# Patient Record
Sex: FEMALE | URBAN - METROPOLITAN AREA
[De-identification: names, ages, dates, MRNs, and addresses within clinical notes are randomized per-mention and may not be internally consistent; named-entity substitution may affect disease eponyms.]

---

## 2019-08-09 ENCOUNTER — HOSPITAL ENCOUNTER (OUTPATIENT)
Age: 79
Setting detail: SPECIMEN
Discharge: HOME | End: 2019-08-09
Payer: MEDICARE

## 2019-08-14 LAB — SURGICAL PATHOLOGY REPORT: NORMAL

## 2020-03-04 ENCOUNTER — HOSPITAL ENCOUNTER (OUTPATIENT)
Age: 80
Setting detail: SPECIMEN
Discharge: HOME | End: 2020-03-04
Payer: MEDICARE

## 2020-03-09 LAB — SURGICAL PATHOLOGY REPORT: NORMAL

## 2023-05-10 ENCOUNTER — HOSPITAL ENCOUNTER (OUTPATIENT)
Age: 83
Setting detail: SPECIMEN
Discharge: HOME | End: 2023-05-10

## 2023-05-10 ENCOUNTER — OFFICE VISIT (OUTPATIENT)
Dept: FAMILY MEDICINE CLINIC | Age: 83
End: 2023-05-10
Payer: MEDICARE

## 2023-05-10 VITALS
TEMPERATURE: 97.6 F | SYSTOLIC BLOOD PRESSURE: 112 MMHG | HEART RATE: 82 BPM | HEIGHT: 64 IN | DIASTOLIC BLOOD PRESSURE: 76 MMHG | OXYGEN SATURATION: 96 % | BODY MASS INDEX: 33.29 KG/M2 | WEIGHT: 195 LBS

## 2023-05-10 DIAGNOSIS — R53.83 LETHARGY: ICD-10-CM

## 2023-05-10 DIAGNOSIS — Z85.850 HISTORY OF THYROID CANCER: ICD-10-CM

## 2023-05-10 DIAGNOSIS — Z13.220 LIPID SCREENING: ICD-10-CM

## 2023-05-10 DIAGNOSIS — R73.09 ABNORMAL GLUCOSE: ICD-10-CM

## 2023-05-10 DIAGNOSIS — E89.0 H/O THYROIDECTOMY: ICD-10-CM

## 2023-05-10 DIAGNOSIS — Z00.00 ENCOUNTER FOR MEDICAL EXAMINATION TO ESTABLISH CARE: Primary | ICD-10-CM

## 2023-05-10 DIAGNOSIS — Z13.1 DIABETES MELLITUS SCREENING: ICD-10-CM

## 2023-05-10 DIAGNOSIS — E55.9 VITAMIN D DEFICIENCY: ICD-10-CM

## 2023-05-10 DIAGNOSIS — R91.1 LUNG NODULE: ICD-10-CM

## 2023-05-10 LAB
25(OH)D3 SERPL-MCNC: 69.9 NG/ML
ABSOLUTE EOS #: 0.08 K/UL (ref 0–0.44)
ABSOLUTE IMMATURE GRANULOCYTE: 0.03 K/UL (ref 0–0.3)
ABSOLUTE LYMPH #: 1.42 K/UL (ref 1.1–3.7)
ABSOLUTE MONO #: 0.52 K/UL (ref 0.1–1.2)
ALBUMIN SERPL-MCNC: 4.1 G/DL (ref 3.5–5.2)
ALBUMIN/GLOBULIN RATIO: 1.3 (ref 1–2.5)
ALP SERPL-CCNC: 99 U/L (ref 35–104)
ALT SERPL-CCNC: 14 U/L (ref 5–33)
ANION GAP SERPL CALCULATED.3IONS-SCNC: 17 MMOL/L (ref 9–17)
AST SERPL-CCNC: 16 U/L
BASOPHILS # BLD: 0 % (ref 0–2)
BASOPHILS ABSOLUTE: 0.03 K/UL (ref 0–0.2)
BILIRUB SERPL-MCNC: 0.3 MG/DL (ref 0.3–1.2)
BUN SERPL-MCNC: 24 MG/DL (ref 8–23)
CALCIUM SERPL-MCNC: 10.4 MG/DL (ref 8.6–10.4)
CHLORIDE SERPL-SCNC: 102 MMOL/L (ref 98–107)
CHOLEST SERPL-MCNC: 187 MG/DL
CHOLESTEROL/HDL RATIO: 3.1
CO2 SERPL-SCNC: 23 MMOL/L (ref 20–31)
CREAT SERPL-MCNC: 0.78 MG/DL (ref 0.5–0.9)
EOSINOPHILS RELATIVE PERCENT: 1 % (ref 1–4)
GFR SERPL CREATININE-BSD FRML MDRD: >60 ML/MIN/1.73M2
GLUCOSE SERPL-MCNC: 106 MG/DL (ref 70–99)
HCT VFR BLD AUTO: 43.3 % (ref 36.3–47.1)
HDLC SERPL-MCNC: 60 MG/DL
HGB BLD-MCNC: 13.5 G/DL (ref 11.9–15.1)
IMMATURE GRANULOCYTES: 0 %
LDLC SERPL CALC-MCNC: 103 MG/DL (ref 0–130)
LYMPHOCYTES # BLD: 20 % (ref 24–43)
MCH RBC QN AUTO: 27.7 PG (ref 25.2–33.5)
MCHC RBC AUTO-ENTMCNC: 31.2 G/DL (ref 28.4–34.8)
MCV RBC AUTO: 88.9 FL (ref 82.6–102.9)
MONOCYTES # BLD: 7 % (ref 3–12)
NRBC AUTOMATED: 0 PER 100 WBC
PDW BLD-RTO: 14.5 % (ref 11.8–14.4)
PLATELET # BLD AUTO: 314 K/UL (ref 138–453)
PMV BLD AUTO: 9.7 FL (ref 8.1–13.5)
POTASSIUM SERPL-SCNC: 4.4 MMOL/L (ref 3.7–5.3)
PROT SERPL-MCNC: 7.3 G/DL (ref 6.4–8.3)
RBC # BLD: 4.87 M/UL (ref 3.95–5.11)
RBC # BLD: ABNORMAL 10*6/UL
SEG NEUTROPHILS: 72 % (ref 36–65)
SEGMENTED NEUTROPHILS ABSOLUTE COUNT: 5.05 K/UL (ref 1.5–8.1)
SODIUM SERPL-SCNC: 142 MMOL/L (ref 135–144)
T4 FREE SERPL-MCNC: 1.8 NG/DL (ref 0.9–1.7)
TRIGL SERPL-MCNC: 121 MG/DL
TSH SERPL-ACNC: 0.18 UIU/ML (ref 0.3–5)
WBC # BLD AUTO: 7.1 K/UL (ref 3.5–11.3)

## 2023-05-10 PROCEDURE — 1123F ACP DISCUSS/DSCN MKR DOCD: CPT | Performed by: NURSE PRACTITIONER

## 2023-05-10 PROCEDURE — G8400 PT W/DXA NO RESULTS DOC: HCPCS | Performed by: NURSE PRACTITIONER

## 2023-05-10 PROCEDURE — 1090F PRES/ABSN URINE INCON ASSESS: CPT | Performed by: NURSE PRACTITIONER

## 2023-05-10 PROCEDURE — 99203 OFFICE O/P NEW LOW 30 MIN: CPT | Performed by: NURSE PRACTITIONER

## 2023-05-10 PROCEDURE — 1036F TOBACCO NON-USER: CPT | Performed by: NURSE PRACTITIONER

## 2023-05-10 PROCEDURE — G8427 DOCREV CUR MEDS BY ELIG CLIN: HCPCS | Performed by: NURSE PRACTITIONER

## 2023-05-10 PROCEDURE — G8417 CALC BMI ABV UP PARAM F/U: HCPCS | Performed by: NURSE PRACTITIONER

## 2023-05-10 RX ORDER — CELECOXIB 200 MG/1
200 CAPSULE ORAL
COMMUNITY
Start: 2021-06-11

## 2023-05-10 RX ORDER — LEVOTHYROXINE SODIUM 112 UG/1
1 TABLET ORAL DAILY
COMMUNITY
End: 2023-05-12 | Stop reason: SDUPTHER

## 2023-05-10 RX ORDER — POTASSIUM CHLORIDE 750 MG/1
TABLET, FILM COATED, EXTENDED RELEASE ORAL
COMMUNITY
Start: 2023-03-31

## 2023-05-10 RX ORDER — LISINOPRIL AND HYDROCHLOROTHIAZIDE 20; 12.5 MG/1; MG/1
1 TABLET ORAL DAILY
COMMUNITY
Start: 2022-09-21

## 2023-05-10 RX ORDER — LEVOTHYROXINE SODIUM 0.12 MG/1
1 TABLET ORAL DAILY
COMMUNITY
End: 2023-05-12 | Stop reason: ALTCHOICE

## 2023-05-10 RX ORDER — VERAPAMIL HYDROCHLORIDE 240 MG/1
240 CAPSULE, EXTENDED RELEASE ORAL
COMMUNITY
Start: 2022-09-21

## 2023-05-10 RX ORDER — THIAMINE HCL 100 MG
600 TABLET ORAL 3 TIMES DAILY
COMMUNITY

## 2023-05-10 SDOH — ECONOMIC STABILITY: FOOD INSECURITY: WITHIN THE PAST 12 MONTHS, THE FOOD YOU BOUGHT JUST DIDN'T LAST AND YOU DIDN'T HAVE MONEY TO GET MORE.: NEVER TRUE

## 2023-05-10 SDOH — ECONOMIC STABILITY: INCOME INSECURITY: HOW HARD IS IT FOR YOU TO PAY FOR THE VERY BASICS LIKE FOOD, HOUSING, MEDICAL CARE, AND HEATING?: NOT HARD AT ALL

## 2023-05-10 SDOH — ECONOMIC STABILITY: HOUSING INSECURITY
IN THE LAST 12 MONTHS, WAS THERE A TIME WHEN YOU DID NOT HAVE A STEADY PLACE TO SLEEP OR SLEPT IN A SHELTER (INCLUDING NOW)?: NO

## 2023-05-10 SDOH — ECONOMIC STABILITY: FOOD INSECURITY: WITHIN THE PAST 12 MONTHS, YOU WORRIED THAT YOUR FOOD WOULD RUN OUT BEFORE YOU GOT MONEY TO BUY MORE.: NEVER TRUE

## 2023-05-10 ASSESSMENT — ANXIETY QUESTIONNAIRES
7. FEELING AFRAID AS IF SOMETHING AWFUL MIGHT HAPPEN: 0
IF YOU CHECKED OFF ANY PROBLEMS ON THIS QUESTIONNAIRE, HOW DIFFICULT HAVE THESE PROBLEMS MADE IT FOR YOU TO DO YOUR WORK, TAKE CARE OF THINGS AT HOME, OR GET ALONG WITH OTHER PEOPLE: NOT DIFFICULT AT ALL
6. BECOMING EASILY ANNOYED OR IRRITABLE: 0
GAD7 TOTAL SCORE: 0
5. BEING SO RESTLESS THAT IT IS HARD TO SIT STILL: 0
2. NOT BEING ABLE TO STOP OR CONTROL WORRYING: 0
1. FEELING NERVOUS, ANXIOUS, OR ON EDGE: 0
4. TROUBLE RELAXING: 0
3. WORRYING TOO MUCH ABOUT DIFFERENT THINGS: 0

## 2023-05-10 ASSESSMENT — PATIENT HEALTH QUESTIONNAIRE - PHQ9
1. LITTLE INTEREST OR PLEASURE IN DOING THINGS: 0
SUM OF ALL RESPONSES TO PHQ QUESTIONS 1-9: 0
SUM OF ALL RESPONSES TO PHQ9 QUESTIONS 1 & 2: 0
SUM OF ALL RESPONSES TO PHQ QUESTIONS 1-9: 0
SUM OF ALL RESPONSES TO PHQ QUESTIONS 1-9: 0
2. FEELING DOWN, DEPRESSED OR HOPELESS: 0
SUM OF ALL RESPONSES TO PHQ QUESTIONS 1-9: 0

## 2023-05-10 ASSESSMENT — ENCOUNTER SYMPTOMS
COUGH: 0
CONSTIPATION: 0
VOMITING: 0
DIARRHEA: 0
SHORTNESS OF BREATH: 0
NAUSEA: 0

## 2023-05-11 LAB
EST. AVERAGE GLUCOSE BLD GHB EST-MCNC: 94 MG/DL
HBA1C MFR BLD: 4.9 % (ref 4–6)

## 2023-05-12 DIAGNOSIS — E89.0 H/O THYROIDECTOMY: Primary | ICD-10-CM

## 2023-05-12 RX ORDER — LEVOTHYROXINE SODIUM 112 UG/1
112 TABLET ORAL DAILY
Qty: 30 TABLET | Refills: 1 | Status: SHIPPED | OUTPATIENT
Start: 2023-05-12 | End: 2024-05-11

## 2023-06-02 ENCOUNTER — HOSPITAL ENCOUNTER (OUTPATIENT)
Dept: CT IMAGING | Age: 83
End: 2023-06-02
Payer: MEDICARE

## 2023-06-02 DIAGNOSIS — R91.1 LUNG NODULE: ICD-10-CM

## 2023-06-02 PROCEDURE — 71250 CT THORAX DX C-: CPT

## 2023-09-13 RX ORDER — CELECOXIB 200 MG/1
200 CAPSULE ORAL DAILY
Qty: 90 CAPSULE | Refills: 0 | Status: SHIPPED | OUTPATIENT
Start: 2023-09-13

## 2023-09-13 NOTE — TELEPHONE ENCOUNTER
Lydia Zavala is calling to request a refill on the following medication(s):    Medication Request:  Requested Prescriptions     Pending Prescriptions Disp Refills    celecoxib (CELEBREX) 200 MG capsule [Pharmacy Med Name: CELECOXIB 200 MG CAPSULE] 90 capsule      Sig: TAKE ONE CAPSULE BY MOUTH DAILY       Last Visit Date (If Applicable):  Visit date not found    Next Visit Date:    Visit date not found

## 2023-11-14 ENCOUNTER — OFFICE VISIT (OUTPATIENT)
Dept: FAMILY MEDICINE CLINIC | Age: 83
End: 2023-11-14
Payer: MEDICARE

## 2023-11-14 ENCOUNTER — HOSPITAL ENCOUNTER (OUTPATIENT)
Age: 83
Setting detail: SPECIMEN
Discharge: HOME | End: 2023-11-14

## 2023-11-14 VITALS
DIASTOLIC BLOOD PRESSURE: 94 MMHG | TEMPERATURE: 97.7 F | SYSTOLIC BLOOD PRESSURE: 156 MMHG | HEART RATE: 80 BPM | BODY MASS INDEX: 34.66 KG/M2 | OXYGEN SATURATION: 97 % | HEIGHT: 64 IN | RESPIRATION RATE: 18 BRPM | WEIGHT: 203 LBS

## 2023-11-14 DIAGNOSIS — Z00.00 INITIAL MEDICARE ANNUAL WELLNESS VISIT: Primary | ICD-10-CM

## 2023-11-14 DIAGNOSIS — M81.0 OSTEOPOROSIS WITHOUT CURRENT PATHOLOGICAL FRACTURE, UNSPECIFIED OSTEOPOROSIS TYPE: ICD-10-CM

## 2023-11-14 DIAGNOSIS — C73 MALIGNANT NEOPLASM OF THYROID GLAND (HCC): ICD-10-CM

## 2023-11-14 DIAGNOSIS — E89.0 H/O THYROIDECTOMY: ICD-10-CM

## 2023-11-14 DIAGNOSIS — Z00.00 ENCOUNTER FOR SUBSEQUENT ANNUAL WELLNESS VISIT (AWV) IN MEDICARE PATIENT: Primary | ICD-10-CM

## 2023-11-14 DIAGNOSIS — Z76.89 ENCOUNTER TO ESTABLISH CARE WITH NEW DOCTOR: ICD-10-CM

## 2023-11-14 DIAGNOSIS — Z12.31 SCREENING MAMMOGRAM FOR BREAST CANCER: ICD-10-CM

## 2023-11-14 DIAGNOSIS — R91.1 LUNG NODULE: ICD-10-CM

## 2023-11-14 DIAGNOSIS — Z85.850 HISTORY OF THYROID CANCER: ICD-10-CM

## 2023-11-14 DIAGNOSIS — I10 PRIMARY HYPERTENSION: ICD-10-CM

## 2023-11-14 PROBLEM — Z90.89 H/O THYROIDECTOMY: Status: ACTIVE | Noted: 2023-11-14

## 2023-11-14 PROBLEM — Z98.890 H/O THYROIDECTOMY: Status: ACTIVE | Noted: 2023-11-14

## 2023-11-14 PROBLEM — I44.7 LEFT BUNDLE-BRANCH BLOCK: Status: ACTIVE | Noted: 2023-11-14

## 2023-11-14 LAB
25(OH)D3 SERPL-MCNC: 54.7 NG/ML (ref 30–100)
CA-I BLD-SCNC: 1.31 MMOL/L (ref 1.13–1.33)
PHOSPHATE SERPL-MCNC: 2.9 MG/DL (ref 2.5–4.5)
PTH-INTACT SERPL-MCNC: 35 PG/ML (ref 15–65)
T4 FREE SERPL-MCNC: 1.9 NG/DL (ref 0.93–1.7)
TSH SERPL DL<=0.05 MIU/L-ACNC: 0.94 UIU/ML (ref 0.27–4.2)

## 2023-11-14 PROCEDURE — 99214 OFFICE O/P EST MOD 30 MIN: CPT | Performed by: NURSE PRACTITIONER

## 2023-11-14 PROCEDURE — 3074F SYST BP LT 130 MM HG: CPT | Performed by: NURSE PRACTITIONER

## 2023-11-14 PROCEDURE — G8427 DOCREV CUR MEDS BY ELIG CLIN: HCPCS | Performed by: NURSE PRACTITIONER

## 2023-11-14 PROCEDURE — G8400 PT W/DXA NO RESULTS DOC: HCPCS | Performed by: NURSE PRACTITIONER

## 2023-11-14 PROCEDURE — G8484 FLU IMMUNIZE NO ADMIN: HCPCS | Performed by: NURSE PRACTITIONER

## 2023-11-14 PROCEDURE — G0439 PPPS, SUBSEQ VISIT: HCPCS | Performed by: NURSE PRACTITIONER

## 2023-11-14 PROCEDURE — 1036F TOBACCO NON-USER: CPT | Performed by: NURSE PRACTITIONER

## 2023-11-14 PROCEDURE — 1090F PRES/ABSN URINE INCON ASSESS: CPT | Performed by: NURSE PRACTITIONER

## 2023-11-14 PROCEDURE — 1123F ACP DISCUSS/DSCN MKR DOCD: CPT | Performed by: NURSE PRACTITIONER

## 2023-11-14 PROCEDURE — 3078F DIAST BP <80 MM HG: CPT | Performed by: NURSE PRACTITIONER

## 2023-11-14 PROCEDURE — G8417 CALC BMI ABV UP PARAM F/U: HCPCS | Performed by: NURSE PRACTITIONER

## 2023-11-14 RX ORDER — CELECOXIB 200 MG/1
200 CAPSULE ORAL DAILY
Qty: 90 CAPSULE | Refills: 3 | Status: SHIPPED | OUTPATIENT
Start: 2023-11-14 | End: 2024-11-13

## 2023-11-14 RX ORDER — LEVOTHYROXINE SODIUM 112 UG/1
112 TABLET ORAL DAILY
Qty: 90 TABLET | Refills: 3 | Status: SHIPPED | OUTPATIENT
Start: 2023-11-14 | End: 2024-11-13

## 2023-11-14 RX ORDER — LISINOPRIL AND HYDROCHLOROTHIAZIDE 20; 12.5 MG/1; MG/1
1 TABLET ORAL DAILY
Qty: 90 TABLET | Refills: 3 | Status: SHIPPED | OUTPATIENT
Start: 2023-11-14 | End: 2024-11-13

## 2023-11-14 RX ORDER — VIT C/B6/B5/MAGNESIUM/HERB 173 50-5-6-5MG
500 CAPSULE ORAL DAILY
COMMUNITY

## 2023-11-14 RX ORDER — VERAPAMIL HYDROCHLORIDE 240 MG/1
240 CAPSULE, EXTENDED RELEASE ORAL NIGHTLY
Qty: 90 CAPSULE | Refills: 3 | Status: SHIPPED | OUTPATIENT
Start: 2023-11-14 | End: 2024-11-13

## 2023-11-14 ASSESSMENT — PATIENT HEALTH QUESTIONNAIRE - PHQ9
SUM OF ALL RESPONSES TO PHQ QUESTIONS 1-9: 0
2. FEELING DOWN, DEPRESSED OR HOPELESS: 0
SUM OF ALL RESPONSES TO PHQ QUESTIONS 1-9: 0
SUM OF ALL RESPONSES TO PHQ QUESTIONS 1-9: 0
1. LITTLE INTEREST OR PLEASURE IN DOING THINGS: 0
SUM OF ALL RESPONSES TO PHQ QUESTIONS 1-9: 0
SUM OF ALL RESPONSES TO PHQ9 QUESTIONS 1 & 2: 0

## 2023-11-14 ASSESSMENT — LIFESTYLE VARIABLES
HOW MANY STANDARD DRINKS CONTAINING ALCOHOL DO YOU HAVE ON A TYPICAL DAY: PATIENT DOES NOT DRINK
HOW OFTEN DO YOU HAVE A DRINK CONTAINING ALCOHOL: NEVER

## 2023-11-14 ASSESSMENT — ENCOUNTER SYMPTOMS
CONSTIPATION: 0
SHORTNESS OF BREATH: 0
SORE THROAT: 0
RHINORRHEA: 0
COUGH: 0
ABDOMINAL PAIN: 0
CHEST TIGHTNESS: 0
BACK PAIN: 0
SINUS PRESSURE: 0
DIARRHEA: 0
NAUSEA: 0
BLOOD IN STOOL: 0
TROUBLE SWALLOWING: 0
ABDOMINAL DISTENTION: 0
WHEEZING: 0

## 2023-11-14 ASSESSMENT — VISUAL ACUITY: OU: 1

## 2023-11-14 NOTE — PROGRESS NOTES
[address]   [address]  [phone]    11/14/23     Patient ID  Kaye Dash is a 81 y.o. female  Established patient    Chief Complaint  Kaye Dash presents today for lung nodule, Hypertension, and Thyroid Problem    Have you seen any other physician or provider since your last visit? Ortho- Dr. Michael Sawyer, Endocrinology- Dr. Cyrus Pulliam, Dr. Cristobal Ward- Ophthalmology   Have you had any other diagnostic tests since your last visit? Yes - Records Obtained CT chest, Blood work   Have you been seen in the emergency room and/or had an admission to a hospital since we last saw you? No     ASSESSMENT/PLAN  1. Encounter for subsequent annual wellness visit (AWV) in Medicare patient  2. Primary hypertension  -     PTH, Intact with Ionized Calcium; Future  -     Phosphorus; Future  -     TSH; Future  -     T4, Free; Future  3. Lung nodule  -     Low Dose Chest CT -Abnormal Lung Screen Follow up; Future  4. H/O thyroidectomy  -     PTH, Intact with Ionized Calcium; Future  -     Phosphorus; Future  -     TSH; Future  -     T4, Free; Future  5. History of thyroid cancer  -     PTH, Intact with Ionized Calcium; Future  -     Phosphorus; Future  -     TSH; Future  -     T4, Free; Future  6. Osteoporosis without current pathological fracture, unspecified osteoporosis type  -     PTH, Intact with Ionized Calcium; Future  -     Phosphorus; Future  -     Vitamin D 25 Hydroxy; Future  -     DEXA BONE DENSITY AXIAL SKELETON; Future  7. Encounter to establish care with new doctor     Check labs   Stop potassium   May need changes in BP meds - take lisinopril in am and verapamil nightly      ordered    Request imaging Mario Lee notes and scopes with GI       Return in about 3 months (around 2/14/2024) for HTN.       Patient Care Team:  DONI Dodge - CNP as PCP - General (Nurse Practitioner Family)    SUBJECTIVE/OBJECTIVE  History of Present illness / Visit

## 2023-11-14 NOTE — PATIENT INSTRUCTIONS
Learning About Vision Tests  What are vision tests? The four most common vision tests are visual acuity tests, refraction, visual field tests, and color vision tests. Visual acuity (sharpness) tests  These tests are used: To see if you need glasses or contact lenses. To monitor an eye problem. To check an eye injury. Visual acuity tests are done as part of routine exams. You may also have this test when you get your 's license or apply for some types of jobs. Visual field tests  These tests are used: To check for vision loss in any area of your range of vision. To screen for certain eye diseases. To look for nerve damage after a stroke, head injury, or other problem that could reduce blood flow to the brain. Refraction and color tests  A refraction test is done to find the right prescription for glasses and contact lenses. A color vision test is done to check for color blindness. Color vision is often tested as part of a routine exam. You may also have this test when you apply for a job where recognizing different colors is important, such as , electronics, or the [employer]. How are vision tests done? Visual acuity test   You cover one eye at a time. You read aloud from a wall chart across the room. You read aloud from a small card that you hold in your hand. Refraction   You look into a special device. The device puts lenses of different strengths in front of each eye to see how strong your glasses or contact lenses need to be. Visual field tests   Your doctor may have you look through special machines. Or your doctor may simply have you stare straight ahead while they move a finger into and out of your field of vision. Color vision test   You look at pieces of printed test patterns in various colors. You say what number or symbol you see. Your doctor may have you trace the number or symbol using a pointer. How do these tests feel?   There is very little chance of Patient Education       Well Child Exam 15 to 18 Years   About this topic   Your teen's well child exam is a visit with the doctor to check your child's health. The doctor measures your teen's weight and height, and may measure your teen's body mass index (BMI). The doctor plots these numbers on a growth curve. The growth curve gives a picture of your teen's growth at each visit. The doctor may listen to your teen's heart, lungs, and belly. Your doctor will do a full exam of your teen from the head to the toes.  Your teen may also need shots or blood tests during this visit.  General   Growth and Development   Your doctor will ask you how your teen is developing. The doctor will focus on the skills that most teens your child's age are expected to do. During this time of your teen's life, here are some things you can expect.  Physical development - Your teen may:  Look physically older than actual age  Need reminders about drinking water when active  Not want to do physical activity if your teen does not feel good at sports  Hearing, seeing, and talking - Your teen may:  Be able to see the long-term effects of actions  Have more ability to think and reason logically  Understand many viewpoints  Spend more time using interactive media, rather than face-to-face communication  Feelings and behavior - Your teen may:  Be very independent  Spend a great deal of time with friends  Have an interest in dating  Value the opinions of friends over parents' thoughts or ideas  Want to push the limits of what is allowed  Believe bad things wont happen to them  Feel very sad or have a low mood at times  Feeding - Your teen needs:  To learn to make healthy choices when eating. Serve healthy foods like lean meats, fruits, vegetables, and whole grains. Help your teen choose healthy foods when out to eat.  To start each day with a healthy breakfast  To limit soda, chips, candy, and foods that are high in fats  Healthy snacks available  like fruit, cheese and crackers, or peanut butter  To eat meals as a part of the family. Turn the TV and cell phones off while eating. Talk about your day, rather than focusing on what your teen is eating.  Sleep - Your teen:  Needs 8 to 9 hours of sleep each night  Should be allowed to read each night before bed. Have your teen brush and floss the teeth before going to bed as well.  Should limit TV, phone, and computers for an hour before bedtime  Keep cell phones, tablets, televisions, and other electronic devices out of bedrooms overnight. They interfere with sleep.  Needs a routine to make week nights easier. Encourage your teen to get up at a normal time on weekends instead of sleeping late.  Shots or vaccines - It is important for your teen to get shots on time. This protects your teen from very serious illnesses like pneumonia, blood and brain infections, tetanus, flu, or cancer. Your teen may need:  HPV or human papillomavirus vaccine  Influenza vaccine  Meningococcal vaccine  Help for Parents   Activities.  Encourage your teen to spend at least 30 to 60 minutes each day being physically active.  Offer your teen a variety of activities to take part in. Include music, sports, arts and crafts, and other things your teen is interested in. Take care not to over schedule your teen. One to 2 activities a week outside of school is often a good number for your teen.  Make sure your teen wears a helmet when using anything with wheels like skates, skateboard, bike, etc.  Encourage time spent with friends. Provide a safe area for this.  Know where and who your teen is with at all times. Get to know your teen's friends and families.  Here are some things you can do to help keep your teen safe and healthy.  Teach your teen about safe driving. Remind your teen never to ride with someone who has been drinking or using drugs. Talk about distracted driving. Teach your teen never to text or use a cell phone while  driving.  Make sure your teen uses a seat belt when driving or riding in a car. Talk with your teen about how many passengers are allowed in the car.  Talk to your teen about the dangers of smoking, drinking alcohol, and using drugs. Do not allow anyone to smoke in your home or around your teen.  Talk with your teen about peer pressure. Help your teen learn how to handle risky things friends may want to do.  Talk about sexually responsible behavior and delaying sexual intercourse. Discuss birth control and sexually-transmitted diseases. Talk about how alcohol or drugs can influence the ability to make good decisions.  Remind your teen to use headphones responsibly. Limit how loud the volume is turned up. Never wear headphones, text, or use a cell phone while riding a bike or crossing the street.  Protect your teen from gun injuries. If you have a gun, use a trigger lock. Keep the gun locked up and the bullets kept in a separate place.  Limit screen time for teens to 1 to 2 hours per day. This includes TV, phones, computers, and video games.  Parents need to think about:  Monitoring your teen's computer and phone use, especially when on the Internet  How to keep open lines of communication about sex and dating  College and work plans for your teen  Finding an adult doctor to care for your teen  Turning responsibilities of health care over to your teen  Having your teen help with some family chores to encourage responsibility within the family  The next well teen visit will most likely be in 1 year. At this visit, your doctor may:  Do a full check up on your teen  Talk about college and work  Talk about sexuality and sexually-transmitted diseases  Talk about driving and safety  When do I need to call the doctor?   Fever of 100.4°F (38°C) or higher  Low mood, suddenly getting poor grades, or missing school  You are worried about alcohol or drug use  You are worried about your teen's development  Where can I learn more?    Centers for Disease Control and Prevention  https://www.cdc.gov/ncbddd/childdevelopment/positiveparenting/adolescence2.html   Centers for Disease Control and Prevention  https://www.cdc.gov/vaccines/parents/diseases/teen/index.html   KidsHealth  http://kidshealth.org/parent/growth/medical/checkup-15yrs.html#roc760   KidsHealth  http://kidshealth.org/parent/growth/medical/checkup_16yrs.html#biz297   KidsHealth  http://kidshealth.org/parent/growth/medical/checkup_17yrs.html#eit521   KidsHealth  http://kidshealth.org/parent/growth/medical/checkup_18yrs.html#   Last Reviewed Date   2019-10-14  Consumer Information Use and Disclaimer   This information is not specific medical advice and does not replace information you receive from your health care provider. This is only a brief summary of general information. It does NOT include all information about conditions, illnesses, injuries, tests, procedures, treatments, therapies, discharge instructions or life-style choices that may apply to you. You must talk with your health care provider for complete information about your health and treatment options. This information should not be used to decide whether or not to accept your health care providers advice, instructions or recommendations. Only your health care provider has the knowledge and training to provide advice that is right for you.  Copyright   Copyright © 2021 UpToDate, Inc. and its affiliates and/or licensors. All rights reserved.    If you have an active [facility] account, please look for your well child questionnaire to come to your [facility] account before your next well child visit.  Children younger than 13 must be in the rear seat of a vehicle when available and properly restrained.

## 2023-11-14 NOTE — PROGRESS NOTES
Medicare Annual Wellness Visit    Joaquin Dutta is here for Medicare AWV    Assessment & Plan   Initial Medicare annual wellness visit  Malignant neoplasm of thyroid gland ([address])  Lung nodule  H/O thyroidectomy  Recommendations for Preventive Services Due: see orders and patient instructions/AVS.  Recommended screening schedule for the next 5-10 years is provided to the patient in written form: see Patient Instructions/AVS.     No follow-ups on file. Subjective   The following acute and/or chronic problems were also addressed today:  See additional OV note. Patient's complete Health Risk Assessment and screening values have been reviewed and are found in Flowsheets. The following problems were reviewed today and where indicated follow up appointments were made and/or referrals ordered. Positive Risk Factor Screenings with Interventions:                 Weight and Activity:  Physical Activity: Insufficiently Active (11/14/2023)    Exercise Vital Sign     Days of Exercise per Week: 2 days     Minutes of Exercise per Session: 30 min     On average, how many days per week do you engage in moderate to strenuous exercise (like a brisk walk)?: 2 days ([address] [facility])  Have you lost any weight without trying in the past 3 months?: No  There is no height or weight on file to calculate BMI. (!) Abnormal  Obesity Interventions:  Patient declines any further evaluation or treatment            Vision Screen:  Do you have difficulty driving, watching TV, or doing any of your daily activities because of your eyesight?: (!) Yes (Needing cataract surgery- bilateral)  Have you had an eye exam within the past year?: Yes  No results found.     Interventions:   Patient declines any further evaluation or treatment  Follows routinely with eye MD     Safety:  Do you have working smoke detectors?: (!) No  Interventions:  Patient declined any further interventions or treatment  Has many assistive devices in

## 2023-11-14 NOTE — PATIENT INSTRUCTIONS
Hold potassium   Take Prinzide daily and verapamil nightly     Call and scheduled with endocrinology     Schedule mammogram, dexa scan and CT lung screen

## 2023-11-16 DIAGNOSIS — R91.1 LUNG NODULE: Primary | ICD-10-CM

## 2023-11-18 NOTE — RESULT ENCOUNTER NOTE
Please call. Patient does not use MyChart. Labs reviewed. Thyroid stable. No changes in pharmaology.

## 2023-12-12 ENCOUNTER — HOSPITAL ENCOUNTER (OUTPATIENT)
Dept: CT IMAGING | Age: 83
Discharge: HOME | End: 2023-12-14
Payer: MEDICARE

## 2023-12-12 DIAGNOSIS — R91.1 LUNG NODULE: ICD-10-CM

## 2023-12-12 PROCEDURE — 71250 CT THORAX DX C-: CPT

## 2023-12-26 DIAGNOSIS — R91.1 LUNG NODULE: Primary | ICD-10-CM

## 2024-01-23 RX ORDER — POTASSIUM CHLORIDE 750 MG/1
TABLET, FILM COATED, EXTENDED RELEASE ORAL
Qty: 90 TABLET | Refills: 0 | Status: SHIPPED | OUTPATIENT
Start: 2024-01-23 | End: 2025-01-22

## 2024-01-23 NOTE — TELEPHONE ENCOUNTER
Daniela William is calling to request a refill on the following medication(s):    Medication Request:  Requested Prescriptions     Pending Prescriptions Disp Refills    potassium chloride (KLOR-CON) 10 MEQ extended release tablet [Pharmacy Med Name: POTASSIUM CHLORIDE ER 10MEQ TABLET] 90 tablet      Sig: TAKE ONE TABLET BY MOUTH DAILY WITH FOOD       Last Visit Date (If Applicable):  Visit date not found    Next Visit Date:    Visit date not found

## 2024-02-13 ENCOUNTER — OFFICE VISIT (OUTPATIENT)
Dept: FAMILY MEDICINE CLINIC | Age: 84
End: 2024-02-13
Payer: MEDICARE

## 2024-02-13 VITALS
SYSTOLIC BLOOD PRESSURE: 130 MMHG | BODY MASS INDEX: 31.96 KG/M2 | DIASTOLIC BLOOD PRESSURE: 86 MMHG | RESPIRATION RATE: 16 BRPM | WEIGHT: 186.2 LBS | TEMPERATURE: 97.5 F | OXYGEN SATURATION: 97 % | HEART RATE: 86 BPM

## 2024-02-13 DIAGNOSIS — R91.1 LUNG NODULE: ICD-10-CM

## 2024-02-13 DIAGNOSIS — M19.90 ARTHRITIS: ICD-10-CM

## 2024-02-13 DIAGNOSIS — I10 PRIMARY HYPERTENSION: Primary | ICD-10-CM

## 2024-02-13 DIAGNOSIS — E89.0 H/O THYROIDECTOMY: ICD-10-CM

## 2024-02-13 DIAGNOSIS — R32 URINARY INCONTINENCE, UNSPECIFIED TYPE: ICD-10-CM

## 2024-02-13 DIAGNOSIS — Z13.31 SCREENING FOR DEPRESSION: ICD-10-CM

## 2024-02-13 DIAGNOSIS — C73 MALIGNANT NEOPLASM OF THYROID GLAND (HCC): ICD-10-CM

## 2024-02-13 DIAGNOSIS — M81.0 OSTEOPOROSIS WITHOUT CURRENT PATHOLOGICAL FRACTURE, UNSPECIFIED OSTEOPOROSIS TYPE: ICD-10-CM

## 2024-02-13 PROCEDURE — 3079F DIAST BP 80-89 MM HG: CPT | Performed by: NURSE PRACTITIONER

## 2024-02-13 PROCEDURE — G8417 CALC BMI ABV UP PARAM F/U: HCPCS | Performed by: NURSE PRACTITIONER

## 2024-02-13 PROCEDURE — 1123F ACP DISCUSS/DSCN MKR DOCD: CPT | Performed by: NURSE PRACTITIONER

## 2024-02-13 PROCEDURE — G8400 PT W/DXA NO RESULTS DOC: HCPCS | Performed by: NURSE PRACTITIONER

## 2024-02-13 PROCEDURE — 1036F TOBACCO NON-USER: CPT | Performed by: NURSE PRACTITIONER

## 2024-02-13 PROCEDURE — 0509F URINE INCON PLAN DOCD: CPT | Performed by: NURSE PRACTITIONER

## 2024-02-13 PROCEDURE — 99214 OFFICE O/P EST MOD 30 MIN: CPT | Performed by: NURSE PRACTITIONER

## 2024-02-13 PROCEDURE — G2211 COMPLEX E/M VISIT ADD ON: HCPCS | Performed by: NURSE PRACTITIONER

## 2024-02-13 PROCEDURE — 1090F PRES/ABSN URINE INCON ASSESS: CPT | Performed by: NURSE PRACTITIONER

## 2024-02-13 PROCEDURE — G8427 DOCREV CUR MEDS BY ELIG CLIN: HCPCS | Performed by: NURSE PRACTITIONER

## 2024-02-13 PROCEDURE — G8484 FLU IMMUNIZE NO ADMIN: HCPCS | Performed by: NURSE PRACTITIONER

## 2024-02-13 PROCEDURE — 3075F SYST BP GE 130 - 139MM HG: CPT | Performed by: NURSE PRACTITIONER

## 2024-02-13 RX ORDER — OXYBUTYNIN CHLORIDE 5 MG/1
5 TABLET, EXTENDED RELEASE ORAL DAILY
Qty: 30 TABLET | Refills: 2 | Status: SHIPPED | OUTPATIENT
Start: 2024-02-13

## 2024-02-13 ASSESSMENT — PATIENT HEALTH QUESTIONNAIRE - PHQ9
SUM OF ALL RESPONSES TO PHQ9 QUESTIONS 1 & 2: 0
SUM OF ALL RESPONSES TO PHQ QUESTIONS 1-9: 0
1. LITTLE INTEREST OR PLEASURE IN DOING THINGS: 0
SUM OF ALL RESPONSES TO PHQ QUESTIONS 1-9: 0
SUM OF ALL RESPONSES TO PHQ QUESTIONS 1-9: 0
2. FEELING DOWN, DEPRESSED OR HOPELESS: 0
SUM OF ALL RESPONSES TO PHQ QUESTIONS 1-9: 0

## 2024-02-13 NOTE — PROGRESS NOTES
Verapamil Approval:     RX Case #: [id number]    Until 2039  
  Coloration: Skin is not ashen, cyanotic, jaundiced or pale.   Neurological:      General: No focal deficit present.      Mental Status: She is alert and oriented to person, place, and time.      Motor: Motor function is intact.      Gait: Gait is intact.   Psychiatric:         Attention and Perception: Attention and perception normal.         Mood and Affect: Mood and affect normal.         Speech: Speech normal.         Behavior: Behavior normal. Behavior is cooperative.         Thought Content: Thought content normal. Thought content does not include suicidal ideation. Thought content does not include suicidal plan.         Cognition and Memory: Cognition and memory normal.         Judgment: Judgment normal.           Electronically signed by DONI Ram - CNP, DONI-CNP on 2/20/2024 at 8:35 PM    Please note that this chart was generated using voice recognition Dragon dictation software.  Although every effort was made to ensure the accuracy of this automated transcription, some errors in transcription may have occurred.

## 2024-02-20 ASSESSMENT — VISUAL ACUITY: OU: 1

## 2024-03-05 ENCOUNTER — HOSPITAL ENCOUNTER (OUTPATIENT)
Dept: MAMMOGRAPHY | Age: 84
Discharge: HOME | End: 2024-03-07
Payer: MEDICARE

## 2024-03-05 VITALS — BODY MASS INDEX: 31.76 KG/M2 | HEIGHT: 64 IN | WEIGHT: 186 LBS

## 2024-03-05 DIAGNOSIS — Z12.31 SCREENING MAMMOGRAM FOR BREAST CANCER: ICD-10-CM

## 2024-03-05 DIAGNOSIS — M81.0 OSTEOPOROSIS WITHOUT CURRENT PATHOLOGICAL FRACTURE, UNSPECIFIED OSTEOPOROSIS TYPE: ICD-10-CM

## 2024-03-05 PROCEDURE — 77063 BREAST TOMOSYNTHESIS BI: CPT

## 2024-03-05 PROCEDURE — 77080 DXA BONE DENSITY AXIAL: CPT

## 2024-03-08 NOTE — RESULT ENCOUNTER NOTE
Please call. Patient does not use MyChart.    Dexa scan indicate bone weakness (osteopenia)   Continue Caltrate

## 2024-04-02 ENCOUNTER — OFFICE VISIT (OUTPATIENT)
Dept: PULMONOLOGY | Age: 84
End: 2024-04-02
Payer: MEDICARE

## 2024-04-02 VITALS
HEIGHT: 64 IN | WEIGHT: 187 LBS | BODY MASS INDEX: 31.92 KG/M2 | RESPIRATION RATE: 16 BRPM | OXYGEN SATURATION: 99 % | TEMPERATURE: 97.9 F | HEART RATE: 77 BPM | DIASTOLIC BLOOD PRESSURE: 77 MMHG | SYSTOLIC BLOOD PRESSURE: 130 MMHG

## 2024-04-02 DIAGNOSIS — R01.1 SYSTOLIC MURMUR: ICD-10-CM

## 2024-04-02 DIAGNOSIS — E89.0 H/O THYROIDECTOMY: ICD-10-CM

## 2024-04-02 DIAGNOSIS — R91.8 MULTIPLE LUNG NODULES ON CT: Primary | ICD-10-CM

## 2024-04-02 DIAGNOSIS — Z85.850 HISTORY OF THYROID CANCER: ICD-10-CM

## 2024-04-02 PROCEDURE — G8399 PT W/DXA RESULTS DOCUMENT: HCPCS | Performed by: INTERNAL MEDICINE

## 2024-04-02 PROCEDURE — 99204 OFFICE O/P NEW MOD 45 MIN: CPT | Performed by: INTERNAL MEDICINE

## 2024-04-02 PROCEDURE — 1123F ACP DISCUSS/DSCN MKR DOCD: CPT | Performed by: INTERNAL MEDICINE

## 2024-04-02 PROCEDURE — 1090F PRES/ABSN URINE INCON ASSESS: CPT | Performed by: INTERNAL MEDICINE

## 2024-04-02 PROCEDURE — G8417 CALC BMI ABV UP PARAM F/U: HCPCS | Performed by: INTERNAL MEDICINE

## 2024-04-02 PROCEDURE — 1036F TOBACCO NON-USER: CPT | Performed by: INTERNAL MEDICINE

## 2024-04-02 PROCEDURE — G8427 DOCREV CUR MEDS BY ELIG CLIN: HCPCS | Performed by: INTERNAL MEDICINE

## 2024-04-02 NOTE — PROGRESS NOTES
REASON FOR THE CONSULTATION:  Chief Complaint   Patient presents with    Pulmonary Nodule     New patient- lung nodule      HISTORY OF PRESENT ILLNESS:    Daniela William is a 83 y.o. year old female here for evaluation of multiple lung nodules which were seen on CT chest.  In June 2023 patient had chest pain went to the hospital and was diagnosed as having GERD but had a CT chest which showed multiple lung nodules.  She had a follow-up CT chest in December 2023 which showed stable lung nodules largest 5 mm.  She has previous history of thyroid cancer which has been treated but no history of lung cancer.  No family history of lung cancer.  She is a non-smoker.  No occupational exposure.  No weight loss.  No shortness of breath or cough or hemoptysis.           LUNG CANCER SCREENING     CRITERIA MET    []     CT ORDERED  []      CRITERIA NOT MET   []      REFUSED                    [x]        REASON CRITERIA NOT MET     SMOKING LESS THAN 30 PY  []      AGE LESS THAN 55 or GREATER 77 YEARS  []      QUIT SMOKING 15 YEARS OR GREATER   []      RECENT CT WITH IN 11 MONTHS    []      LIFE EXPECTANCY < 5 YEARS   []      SIGNS  AND SYMPTOMS OF LUNG CANCER   []         Immunization   Immunization History   Administered Date(s) Administered    COVID-19, MODERNA BLUE border, Primary or Immunocompromised, (age 12y+), IM, 100 mcg/0.5mL 01/30/2021, 02/27/2021, 12/04/2021    COVID-19, PFIZER Bivalent, DO NOT Dilute, (age 12y+), IM, 30 mcg/0.3 mL 02/12/2023    Influenza, FLUAD, (age 65 y+), Adjuvanted, 0.5mL 10/20/2021, 09/12/2022, 09/11/2023    Influenza, FLUZONE (age 65 y+), High Dose, 0.7mL 01/07/2021    Influenza, High Dose (Fluzone 65 yrs and older) 10/01/2019    Zoster Recombinant (Shingrix) 03/05/2022, 08/03/2022        PAST MEDICAL HISTORY:       Diagnosis Date    Chronic knee pain     Cortisone injections    History of thyroid cancer     Hypertension     Polyneuropathy     Vitamin D deficiency          Family History:

## 2024-04-09 ENCOUNTER — TELEPHONE (OUTPATIENT)
Dept: FAMILY MEDICINE CLINIC | Age: 84
End: 2024-04-09

## 2024-04-09 ENCOUNTER — TELEPHONE (OUTPATIENT)
Dept: PULMONOLOGY | Age: 84
End: 2024-04-09

## 2024-04-09 NOTE — TELEPHONE ENCOUNTER
Patient called in regards to her CT being scheduled.  I transferred patient to scheduling to get scheduled at her convenience

## 2024-04-09 NOTE — TELEPHONE ENCOUNTER
Patient (Emily) called about her recent visit with Pulmonology - Dr. Andrew.   Where she was told she had  Systolic murmur. Wanted to know what  to do next. Was told by Pulmonology to follow up with  Page LING. Has an appointment on 8/13 wanted to know what Page LING recommends

## 2024-04-23 ENCOUNTER — OFFICE VISIT (OUTPATIENT)
Dept: FAMILY MEDICINE CLINIC | Age: 84
End: 2024-04-23
Payer: MEDICARE

## 2024-04-23 VITALS
WEIGHT: 183.2 LBS | SYSTOLIC BLOOD PRESSURE: 128 MMHG | BODY MASS INDEX: 31.45 KG/M2 | DIASTOLIC BLOOD PRESSURE: 78 MMHG | TEMPERATURE: 98.1 F | OXYGEN SATURATION: 99 % | HEART RATE: 73 BPM

## 2024-04-23 DIAGNOSIS — R91.1 LUNG NODULE: ICD-10-CM

## 2024-04-23 DIAGNOSIS — R01.1 MURMUR, HEART: ICD-10-CM

## 2024-04-23 DIAGNOSIS — R32 URINARY INCONTINENCE, UNSPECIFIED TYPE: ICD-10-CM

## 2024-04-23 DIAGNOSIS — R60.0 PEDAL EDEMA: ICD-10-CM

## 2024-04-23 DIAGNOSIS — E89.0 H/O THYROIDECTOMY: ICD-10-CM

## 2024-04-23 DIAGNOSIS — Z85.850 HISTORY OF THYROID CANCER: ICD-10-CM

## 2024-04-23 DIAGNOSIS — I10 PRIMARY HYPERTENSION: Primary | ICD-10-CM

## 2024-04-23 PROCEDURE — 99213 OFFICE O/P EST LOW 20 MIN: CPT | Performed by: NURSE PRACTITIONER

## 2024-04-23 PROCEDURE — 1090F PRES/ABSN URINE INCON ASSESS: CPT | Performed by: NURSE PRACTITIONER

## 2024-04-23 PROCEDURE — 1036F TOBACCO NON-USER: CPT | Performed by: NURSE PRACTITIONER

## 2024-04-23 PROCEDURE — 3074F SYST BP LT 130 MM HG: CPT | Performed by: NURSE PRACTITIONER

## 2024-04-23 PROCEDURE — G8399 PT W/DXA RESULTS DOCUMENT: HCPCS | Performed by: NURSE PRACTITIONER

## 2024-04-23 PROCEDURE — 3078F DIAST BP <80 MM HG: CPT | Performed by: NURSE PRACTITIONER

## 2024-04-23 PROCEDURE — G8417 CALC BMI ABV UP PARAM F/U: HCPCS | Performed by: NURSE PRACTITIONER

## 2024-04-23 PROCEDURE — G8427 DOCREV CUR MEDS BY ELIG CLIN: HCPCS | Performed by: NURSE PRACTITIONER

## 2024-04-23 PROCEDURE — 1123F ACP DISCUSS/DSCN MKR DOCD: CPT | Performed by: NURSE PRACTITIONER

## 2024-04-23 PROCEDURE — 0509F URINE INCON PLAN DOCD: CPT | Performed by: NURSE PRACTITIONER

## 2024-04-23 NOTE — PROGRESS NOTES
[facility]   [address] [facility] [address]  [phone]    4/23/24     Patient ID  Daniela William is a 84 y.o. female  Established patient    Chief Complaint  Daniela William presents today for Heart Murmur (Systolic heart murmur pulm)      ASSESSMENT/PLAN  1. Primary hypertension  -     Echo (TTE) complete (PRN contrast/bubble/strain/3D); Future  2. Murmur, heart  -     Echo (TTE) complete (PRN contrast/bubble/strain/3D); Future  3. Pedal edema  -     Echo (TTE) complete (PRN contrast/bubble/strain/3D); Future  4. Urinary incontinence, unspecified type  5. Lung nodule  6. H/O thyroidectomy  7. History of thyroid cancer     If abnormal echo will refer to cardiology     The ASCVD Risk score (Brian SIMONS, et al., 2019) failed to calculate for the following reasons:    The 2019 ASCVD risk score is only valid for ages 40 to 79      Return for next OV scheduled.      Patient Care Team:  Page Gonzalez APRN - CNP as PCP - General (Nurse Practitioner Family)  Page Gonzalez APRN - CNP as PCP - Empaneled Provider    SUBJECTIVE/OBJECTIVE  History of Present illness / Visit Summary   Patient presents today for follow up   Reviewed health maintenance and any recent labs/imaging      Scheduled to see endocrinology may 16     Feet have been cold, not to touch  No painful, numbness - known neuropathy     Has seen pulmonology who heard heart murmur -   No dyspnea, pedal edema improved when       Last OV -   Routine labs ordered   Start Ditropan for bladder spasms   Need to call and schedule follow up with endo - monitoring imaging for past thyroid cancer - not in remission yet   No labs needed     4/2/24 pulmonology -   PLAN:      Patient has multiple pulmonary nodules which have been stable from June 2023 to December 2023.  I do see calcified granuloma in the spleen and in the left lower lobe one of the lung nodule has calcified.  This is likely benign granulomas.  But because of her

## 2024-05-21 ENCOUNTER — HOSPITAL ENCOUNTER (OUTPATIENT)
Age: 84
Discharge: HOME | End: 2024-05-23
Payer: MEDICARE

## 2024-05-21 VITALS — WEIGHT: 183 LBS | HEIGHT: 64 IN | BODY MASS INDEX: 31.24 KG/M2

## 2024-05-21 DIAGNOSIS — I10 PRIMARY HYPERTENSION: ICD-10-CM

## 2024-05-21 DIAGNOSIS — R60.0 PEDAL EDEMA: ICD-10-CM

## 2024-05-21 DIAGNOSIS — R01.1 MURMUR, HEART: ICD-10-CM

## 2024-05-21 LAB
ECHO AO ROOT DIAM: 3.3 CM
ECHO AO ROOT INDEX: 1.76 CM/M2
ECHO AV AREA PEAK VELOCITY: 2.7 CM2
ECHO AV AREA VTI: 2.8 CM2
ECHO AV AREA/BSA PEAK VELOCITY: 1.4 CM2/M2
ECHO AV AREA/BSA VTI: 1.5 CM2/M2
ECHO AV MEAN GRADIENT: 7 MMHG
ECHO AV MEAN VELOCITY: 1.3 M/S
ECHO AV PEAK GRADIENT: 12 MMHG
ECHO AV PEAK VELOCITY: 1.7 M/S
ECHO AV VELOCITY RATIO: 0.71
ECHO AV VTI: 42.2 CM
ECHO BSA: 1.94 M2
ECHO IVC EXP: 1.2 CM
ECHO IVC INSP: 0.5 CM
ECHO LA AREA 2C: 20.8 CM2
ECHO LA AREA 4C: 20.3 CM2
ECHO LA DIAMETER INDEX: 2.07 CM/M2
ECHO LA DIAMETER: 3.9 CM
ECHO LA MAJOR AXIS: 5.8 CM
ECHO LA MINOR AXIS: 5.5 CM
ECHO LA TO AORTIC ROOT RATIO: 1.18
ECHO LA VOL BP: 64 ML (ref 22–52)
ECHO LA VOL MOD A2C: 66 ML (ref 22–52)
ECHO LA VOL MOD A4C: 61 ML (ref 22–52)
ECHO LA VOL/BSA BIPLANE: 34 ML/M2 (ref 16–34)
ECHO LA VOLUME INDEX MOD A2C: 35 ML/M2 (ref 16–34)
ECHO LA VOLUME INDEX MOD A4C: 32 ML/M2 (ref 16–34)
ECHO LV E' LATERAL VELOCITY: 12 CM/S
ECHO LV E' SEPTAL VELOCITY: 7 CM/S
ECHO LV FRACTIONAL SHORTENING: 25 % (ref 28–44)
ECHO LV INTERNAL DIMENSION DIASTOLE INDEX: 1.91 CM/M2
ECHO LV INTERNAL DIMENSION DIASTOLIC: 3.6 CM (ref 3.9–5.3)
ECHO LV INTERNAL DIMENSION SYSTOLIC INDEX: 1.44 CM/M2
ECHO LV INTERNAL DIMENSION SYSTOLIC: 2.7 CM
ECHO LV IVSD: 1.1 CM (ref 0.6–0.9)
ECHO LV MASS 2D: 124.1 G (ref 67–162)
ECHO LV MASS INDEX 2D: 66 G/M2 (ref 43–95)
ECHO LV POSTERIOR WALL DIASTOLIC: 1.1 CM (ref 0.6–0.9)
ECHO LV RELATIVE WALL THICKNESS RATIO: 0.61
ECHO LVOT AREA: 3.8 CM2
ECHO LVOT AV VTI INDEX: 0.72
ECHO LVOT DIAM: 2.2 CM
ECHO LVOT MEAN GRADIENT: 4 MMHG
ECHO LVOT PEAK GRADIENT: 6 MMHG
ECHO LVOT PEAK VELOCITY: 1.2 M/S
ECHO LVOT STROKE VOLUME INDEX: 61.6 ML/M2
ECHO LVOT SV: 115.9 ML
ECHO LVOT VTI: 30.5 CM
ECHO MV A VELOCITY: 0.84 M/S
ECHO MV E DECELERATION TIME (DT): 172 MS
ECHO MV E VELOCITY: 0.79 M/S
ECHO MV E/A RATIO: 0.94
ECHO MV E/E' LATERAL: 6.58
ECHO MV E/E' RATIO (AVERAGED): 8.93
ECHO MV E/E' SEPTAL: 11.29
ECHO RV BASAL DIMENSION: 3.4 CM
ECHO RV FREE WALL PEAK S': 11 CM/S

## 2024-05-21 PROCEDURE — 93306 TTE W/DOPPLER COMPLETE: CPT

## 2024-05-21 PROCEDURE — 93306 TTE W/DOPPLER COMPLETE: CPT | Performed by: INTERNAL MEDICINE

## 2024-05-22 DIAGNOSIS — R01.1 MURMUR, HEART: ICD-10-CM

## 2024-05-22 DIAGNOSIS — R93.1 ABNORMAL ECHOCARDIOGRAM: Primary | ICD-10-CM

## 2024-05-22 NOTE — RESULT ENCOUNTER NOTE
Please call. Patient does not use MyChart.    Echo does show mild to moderate calcification to aortic valve cusps and mild wall thickness. I am referring to cardiology for monitoring     Please give patient cardiology information

## 2024-05-23 ENCOUNTER — TELEPHONE (OUTPATIENT)
Age: 84
End: 2024-05-23

## 2024-05-23 NOTE — TELEPHONE ENCOUNTER
1st attempt to schedule new appt - patient stated she takes care of her autistic granddaughter and wants to talk with her daughter about the best time to schedule with us since our hours and the patients don't mesh very well. I asked patient if she wants to call back once she gets a plan in order with family and she stated \"yes let me call you back after I speak with my daughter\" and I told her that's fine and made sure patient had our office phone number in which she does.     Patient will call us back when she is ready to schedule appt.

## 2024-06-04 ENCOUNTER — HOSPITAL ENCOUNTER (OUTPATIENT)
Dept: CT IMAGING | Age: 84
Discharge: HOME | End: 2024-06-06
Attending: INTERNAL MEDICINE
Payer: MEDICARE

## 2024-06-04 DIAGNOSIS — R91.8 MULTIPLE LUNG NODULES ON CT: ICD-10-CM

## 2024-06-04 PROCEDURE — 71250 CT THORAX DX C-: CPT

## 2024-06-11 ENCOUNTER — OFFICE VISIT (OUTPATIENT)
Dept: PULMONOLOGY | Age: 84
End: 2024-06-11
Payer: MEDICARE

## 2024-06-11 VITALS
RESPIRATION RATE: 16 BRPM | SYSTOLIC BLOOD PRESSURE: 162 MMHG | OXYGEN SATURATION: 96 % | WEIGHT: 178 LBS | HEIGHT: 64 IN | DIASTOLIC BLOOD PRESSURE: 79 MMHG | BODY MASS INDEX: 30.39 KG/M2 | TEMPERATURE: 97.9 F | HEART RATE: 73 BPM

## 2024-06-11 DIAGNOSIS — Z85.850 HISTORY OF THYROID CANCER: ICD-10-CM

## 2024-06-11 DIAGNOSIS — R91.8 MULTIPLE LUNG NODULES ON CT: Primary | ICD-10-CM

## 2024-06-11 DIAGNOSIS — E89.0 H/O THYROIDECTOMY: ICD-10-CM

## 2024-06-11 PROCEDURE — 1090F PRES/ABSN URINE INCON ASSESS: CPT | Performed by: INTERNAL MEDICINE

## 2024-06-11 PROCEDURE — G8427 DOCREV CUR MEDS BY ELIG CLIN: HCPCS | Performed by: INTERNAL MEDICINE

## 2024-06-11 PROCEDURE — G8417 CALC BMI ABV UP PARAM F/U: HCPCS | Performed by: INTERNAL MEDICINE

## 2024-06-11 PROCEDURE — G8399 PT W/DXA RESULTS DOCUMENT: HCPCS | Performed by: INTERNAL MEDICINE

## 2024-06-11 PROCEDURE — 1036F TOBACCO NON-USER: CPT | Performed by: INTERNAL MEDICINE

## 2024-06-11 PROCEDURE — 1123F ACP DISCUSS/DSCN MKR DOCD: CPT | Performed by: INTERNAL MEDICINE

## 2024-06-11 PROCEDURE — 99214 OFFICE O/P EST MOD 30 MIN: CPT | Performed by: INTERNAL MEDICINE

## 2024-06-11 NOTE — PROGRESS NOTES
months overall low  suspicion given stability and size              Specimen Collected: 12/14/23 06:44 EST           IMPRESSION:  Scattered small bilateral lung nodules, particularly in the right lung.  The  nodules are unchanged from the study of 06/02/2023 suggesting a benign  process.     RECOMMENDATIONS:  Fleischner Society guidelines for follow-up and management of incidentally  detected pulmonary nodules:     Multiple Solid Nodules:     Nodule size less than 6 mm  In a low-risk patient, no routine follow-up.  In a high-risk patient, optional CT at 12 months.     - Low risk patients include individuals with minimal or absent history of  smoking and other known risk factors.     - High risk patients include individuals with a history or smoking or known  risk factors.     Radiology 2017 http://pubs.rsna.org/doi/full/10.1148/radiol.[email or web address]              Specimen Collected: 06/06/24 15:53 EDT             IMPRESSION:     Diagnosis Orders   1. Multiple lung nodules on CT        2. H/O thyroidectomy        3. History of thyroid cancer               :                PLAN:      <6 mm nodules stable since 6/2/2023 . With calcified granulomas in left lower lobe , spleen and left hilar calcification . This suggests benign Granulomas - no further ct chest needed .   Return to clinic as needed .         Requested Prescriptions      No prescriptions requested or ordered in this encounter       There are no discontinued medications.    Daniela received counseling on the following healthy behaviors: nutrition, exercise and medication adherence    Patient given educational materials : see patient instruction       Discussed use, benefit, and side effects of prescribed medications.  Barriers to medication compliance addressed.      All patient questions answered.  Pt voiced understanding.   I hope this updates you on my evaluation and clinical thinking. Thank you for allowing me to participate in his care.

## 2024-08-07 ENCOUNTER — HOSPITAL ENCOUNTER (OUTPATIENT)
Age: 84
Setting detail: SPECIMEN
Discharge: HOME | End: 2024-08-07

## 2024-08-07 DIAGNOSIS — I10 PRIMARY HYPERTENSION: ICD-10-CM

## 2024-08-07 LAB
ALBUMIN SERPL-MCNC: 4.2 G/DL (ref 3.5–5.2)
ALBUMIN/GLOB SERPL: 1 {RATIO} (ref 1–2.5)
ALP SERPL-CCNC: 86 U/L (ref 35–104)
ALT SERPL-CCNC: 11 U/L (ref 10–35)
ANION GAP SERPL CALCULATED.3IONS-SCNC: 12 MMOL/L (ref 9–16)
AST SERPL-CCNC: 16 U/L (ref 10–35)
BASOPHILS # BLD: <0.03 K/UL (ref 0–0.2)
BASOPHILS NFR BLD: 0 % (ref 0–2)
BILIRUB SERPL-MCNC: 0.3 MG/DL (ref 0–1.2)
BUN SERPL-MCNC: 33 MG/DL (ref 8–23)
CALCIUM SERPL-MCNC: 10.2 MG/DL (ref 8.6–10.4)
CHLORIDE SERPL-SCNC: 104 MMOL/L (ref 98–107)
CHOLEST SERPL-MCNC: 193 MG/DL (ref 0–199)
CHOLESTEROL/HDL RATIO: 3
CO2 SERPL-SCNC: 26 MMOL/L (ref 20–31)
CREAT SERPL-MCNC: 0.9 MG/DL (ref 0.5–0.9)
EOSINOPHIL # BLD: 0.19 K/UL (ref 0–0.44)
EOSINOPHILS RELATIVE PERCENT: 4 % (ref 1–4)
ERYTHROCYTE [DISTWIDTH] IN BLOOD BY AUTOMATED COUNT: 14.2 % (ref 11.8–14.4)
GFR, ESTIMATED: 66 ML/MIN/1.73M2
GLUCOSE P FAST SERPL-MCNC: 93 MG/DL (ref 74–99)
HCT VFR BLD AUTO: 44.4 % (ref 36.3–47.1)
HDLC SERPL-MCNC: 60 MG/DL
HGB BLD-MCNC: 13.9 G/DL (ref 11.9–15.1)
IMM GRANULOCYTES # BLD AUTO: <0.03 K/UL (ref 0–0.3)
IMM GRANULOCYTES NFR BLD: 0 %
LDLC SERPL CALC-MCNC: 120 MG/DL (ref 0–100)
LYMPHOCYTES NFR BLD: 1.72 K/UL (ref 1.1–3.7)
LYMPHOCYTES RELATIVE PERCENT: 33 % (ref 24–43)
MAGNESIUM SERPL-MCNC: 2 MG/DL (ref 1.6–2.4)
MCH RBC QN AUTO: 27.5 PG (ref 25.2–33.5)
MCHC RBC AUTO-ENTMCNC: 31.3 G/DL (ref 28.4–34.8)
MCV RBC AUTO: 87.7 FL (ref 82.6–102.9)
MONOCYTES NFR BLD: 0.45 K/UL (ref 0.1–1.2)
MONOCYTES NFR BLD: 9 % (ref 3–12)
NEUTROPHILS NFR BLD: 54 % (ref 36–65)
NEUTS SEG NFR BLD: 2.78 K/UL (ref 1.5–8.1)
NRBC BLD-RTO: 0 PER 100 WBC
PLATELET # BLD AUTO: 283 K/UL (ref 138–453)
PMV BLD AUTO: 10 FL (ref 8.1–13.5)
POTASSIUM SERPL-SCNC: 3.8 MMOL/L (ref 3.7–5.3)
PROT SERPL-MCNC: 7.1 G/DL (ref 6.6–8.7)
RBC # BLD AUTO: 5.06 M/UL (ref 3.95–5.11)
SODIUM SERPL-SCNC: 142 MMOL/L (ref 136–145)
TRIGL SERPL-MCNC: 66 MG/DL
VLDLC SERPL CALC-MCNC: 13 MG/DL
WBC OTHER # BLD: 5.2 K/UL (ref 3.5–11.3)

## 2024-08-13 ENCOUNTER — OFFICE VISIT (OUTPATIENT)
Dept: FAMILY MEDICINE CLINIC | Age: 84
End: 2024-08-13
Payer: MEDICARE

## 2024-08-13 VITALS
HEART RATE: 69 BPM | WEIGHT: 178.2 LBS | BODY MASS INDEX: 30.59 KG/M2 | OXYGEN SATURATION: 95 % | DIASTOLIC BLOOD PRESSURE: 80 MMHG | TEMPERATURE: 97.3 F | SYSTOLIC BLOOD PRESSURE: 130 MMHG | RESPIRATION RATE: 16 BRPM

## 2024-08-13 DIAGNOSIS — Z85.850 HISTORY OF THYROID CANCER: ICD-10-CM

## 2024-08-13 DIAGNOSIS — I10 PRIMARY HYPERTENSION: Primary | ICD-10-CM

## 2024-08-13 DIAGNOSIS — E89.0 H/O THYROIDECTOMY: ICD-10-CM

## 2024-08-13 DIAGNOSIS — R01.1 MURMUR, HEART: ICD-10-CM

## 2024-08-13 PROCEDURE — 3079F DIAST BP 80-89 MM HG: CPT | Performed by: NURSE PRACTITIONER

## 2024-08-13 PROCEDURE — 3075F SYST BP GE 130 - 139MM HG: CPT | Performed by: NURSE PRACTITIONER

## 2024-08-13 PROCEDURE — G8399 PT W/DXA RESULTS DOCUMENT: HCPCS | Performed by: NURSE PRACTITIONER

## 2024-08-13 PROCEDURE — G8417 CALC BMI ABV UP PARAM F/U: HCPCS | Performed by: NURSE PRACTITIONER

## 2024-08-13 PROCEDURE — 1036F TOBACCO NON-USER: CPT | Performed by: NURSE PRACTITIONER

## 2024-08-13 PROCEDURE — 1123F ACP DISCUSS/DSCN MKR DOCD: CPT | Performed by: NURSE PRACTITIONER

## 2024-08-13 PROCEDURE — 1090F PRES/ABSN URINE INCON ASSESS: CPT | Performed by: NURSE PRACTITIONER

## 2024-08-13 PROCEDURE — 99213 OFFICE O/P EST LOW 20 MIN: CPT | Performed by: NURSE PRACTITIONER

## 2024-08-13 PROCEDURE — G8427 DOCREV CUR MEDS BY ELIG CLIN: HCPCS | Performed by: NURSE PRACTITIONER

## 2024-08-13 SDOH — ECONOMIC STABILITY: FOOD INSECURITY: WITHIN THE PAST 12 MONTHS, YOU WORRIED THAT YOUR FOOD WOULD RUN OUT BEFORE YOU GOT MONEY TO BUY MORE.: NEVER TRUE

## 2024-08-13 SDOH — ECONOMIC STABILITY: FOOD INSECURITY: WITHIN THE PAST 12 MONTHS, THE FOOD YOU BOUGHT JUST DIDN'T LAST AND YOU DIDN'T HAVE MONEY TO GET MORE.: NEVER TRUE

## 2024-08-13 SDOH — ECONOMIC STABILITY: INCOME INSECURITY: HOW HARD IS IT FOR YOU TO PAY FOR THE VERY BASICS LIKE FOOD, HOUSING, MEDICAL CARE, AND HEATING?: NOT HARD AT ALL

## 2024-08-13 ASSESSMENT — ENCOUNTER SYMPTOMS
WHEEZING: 0
SHORTNESS OF BREATH: 0
ABDOMINAL DISTENTION: 0
SINUS PRESSURE: 0
CONSTIPATION: 0
SORE THROAT: 0
ABDOMINAL PAIN: 0
TROUBLE SWALLOWING: 0
COUGH: 0
BLOOD IN STOOL: 0
RHINORRHEA: 0
CHEST TIGHTNESS: 0
BACK PAIN: 0
NAUSEA: 0
DIARRHEA: 0

## 2024-08-13 NOTE — PROGRESS NOTES
[facility]   [address] [facility] [address]  [phone]    8/13/24     Patient ID  Daniela William is a 84 y.o. female  Established patient    Chief Complaint  aDniela William presents today for Hypertension, Edema, and Results    Have you seen any other physician or provider since your last visit? Yes - Records Obtained Pulmonology, Endocrinology, Cardiology- Scheduled Dr. Renteria   Have you had any other diagnostic tests since your last visit? Yes - Records Obtained US Thyroid, Blood work, ECHO, CT Chest   Have you been seen in the emergency room and/or had an admission to a hospital since we last saw you? No     ASSESSMENT/PLAN  1. Primary hypertension  2. Murmur, heart  3. H/O thyroidectomy  4. History of thyroid cancer     No labs needed  No refill needed  Health maintenance current  Patient to continue follow with specialties      Return in about 6 months (around 2/13/2025) for AWV to be scheduled with next OV - 2 appts.      Patient Care Team:  Page Gonzalez APRN - CNP as PCP - General (Nurse Practitioner Family)  Page Gonzalez APRN - CNP as PCP - Empaneled Provider  Hema Andrew MD as Consulting Physician (Pulmonary Disease)  Jesus Vang MD (Internal Medicine)    SUBJECTIVE/OBJECTIVE  History of Present illness / Visit Summary   Patient presents today for follow up   Reviewed health maintenance and any recent labs/imaging    Since last OV seen endo - now to follow yearly   Will have labs and repeat us at that time  Seen pulmonology - only go if needed, does not need further monitoring?   Still needs to see cardiology - new referral    Using OTC salanpos for bilateral foot pain   Known neuropathy     6/11/2024 pulmonology -   PLAN:      <6 mm nodules stable since 6/2/2023 . With calcified granulomas in left lower lobe , spleen and left hilar calcification . This suggests benign Granulomas - no further ct chest needed .   Return to clinic as needed .

## 2024-08-23 RX ORDER — VERAPAMIL HYDROCHLORIDE 240 MG/1
240 TABLET, FILM COATED, EXTENDED RELEASE ORAL DAILY
Qty: 90 TABLET | OUTPATIENT
Start: 2024-08-23

## 2024-08-28 ENCOUNTER — TELEPHONE (OUTPATIENT)
Age: 84
End: 2024-08-28

## 2024-08-28 NOTE — TELEPHONE ENCOUNTER
Called and left voice mail in regard to patient's 10/4/24 appt being rescheduled for 10/25/24 @ 1p with Dr. Renteria.     If patient was not ok with this change or had questions/concerns I left office phone number and we will accommodate the best we can.

## 2024-10-25 ENCOUNTER — OFFICE VISIT (OUTPATIENT)
Age: 84
End: 2024-10-25

## 2024-10-25 VITALS
HEART RATE: 89 BPM | BODY MASS INDEX: 31.58 KG/M2 | DIASTOLIC BLOOD PRESSURE: 84 MMHG | OXYGEN SATURATION: 97 % | WEIGHT: 184 LBS | SYSTOLIC BLOOD PRESSURE: 156 MMHG

## 2024-10-25 DIAGNOSIS — I10 PRIMARY HYPERTENSION: ICD-10-CM

## 2024-10-25 DIAGNOSIS — R01.1 MURMUR, HEART: Primary | ICD-10-CM

## 2024-10-25 NOTE — PROGRESS NOTES
Cardiology Office Consultation           CC: Patient is here to establish cardiac care for murmur and abnormal echocardiogram.     HPI  Daniela William is here to establish cardiac care.  She denies any significant cardiac symptoms per se.  No recent chest pain or angina.  No significant dyspnea on routine activities.  No orthopnea lower extremity edema.  She was found to have murmur on examination, underwent echocardiogram which showed aortic valve sclerosis without any stenosis or regurgitation.  She also have left bundle branch block on her EKG today.      Past Medical:  Past Medical History:   Diagnosis Date    Chronic knee pain     Cortisone injections    History of thyroid cancer     Hypertension     Polyneuropathy     Vitamin D deficiency        Past Surgical:  Past Surgical History:   Procedure Laterality Date    CARPAL TUNNEL RELEASE Left     HYSTERECTOMY (CERVIX STATUS UNKNOWN)      Still has ovaries    ORIF ANKLE FRACTURE Left     THYROIDECTOMY         Family History:  No family history on file.    Social History:  Social History     Tobacco Use    Smoking status: Never     Passive exposure: Past    Smokeless tobacco: Never   Vaping Use    Vaping status: Never Used   Substance Use Topics    Alcohol use: Never    Drug use: Never        REVIEW OF SYSTEMS:    Constitutional: there has been no unanticipated weight loss. There's been No change in energy level, No change in activity level.     Eyes: No visual changes or diplopia. No scleral icterus.  ENT: No Headaches, hearing loss or vertigo. No mouth sores or sore throat.  Cardiovascular: As described in HPI.   Respiratory: AS HPI  Gastrointestinal: No abdominal pain, appetite loss, blood in stools. No change in bowel or bladder habits.  Genitourinary: No dysuria, trouble voiding, or hematuria.  Musculoskeletal:  No gait disturbance, No weakness or joint complaints.  Integumentary: No rash or pruritis.  Neurological: No headache, diplopia, change

## 2024-11-05 ENCOUNTER — TELEPHONE (OUTPATIENT)
Dept: FAMILY MEDICINE CLINIC | Age: 84
End: 2024-11-05

## 2024-11-05 NOTE — TELEPHONE ENCOUNTER
Patient calls in today and states on 10/30/24 she received her Covid and flu vaccine from the [facility].  10/30/24 she received the Tdap  and pneumonia at [facility].    Please document.

## 2025-01-09 ENCOUNTER — OFFICE VISIT (OUTPATIENT)
Dept: FAMILY MEDICINE CLINIC | Age: 85
End: 2025-01-09

## 2025-01-09 VITALS
WEIGHT: 192 LBS | DIASTOLIC BLOOD PRESSURE: 72 MMHG | TEMPERATURE: 97.5 F | HEART RATE: 73 BPM | OXYGEN SATURATION: 98 % | SYSTOLIC BLOOD PRESSURE: 138 MMHG | HEIGHT: 64 IN | BODY MASS INDEX: 32.78 KG/M2

## 2025-01-09 DIAGNOSIS — I10 PRIMARY HYPERTENSION: ICD-10-CM

## 2025-01-09 DIAGNOSIS — Z00.00 ENCOUNTER FOR MEDICAL EXAMINATION TO ESTABLISH CARE: Primary | ICD-10-CM

## 2025-01-09 DIAGNOSIS — C73 MALIGNANT NEOPLASM OF THYROID GLAND (HCC): ICD-10-CM

## 2025-01-09 DIAGNOSIS — M81.0 OSTEOPOROSIS WITHOUT CURRENT PATHOLOGICAL FRACTURE, UNSPECIFIED OSTEOPOROSIS TYPE: ICD-10-CM

## 2025-01-09 DIAGNOSIS — E89.0 H/O THYROIDECTOMY: ICD-10-CM

## 2025-01-09 RX ORDER — VERAPAMIL HYDROCHLORIDE 240 MG/1
240 CAPSULE, DELAYED RELEASE ORAL NIGHTLY
Qty: 90 CAPSULE | Refills: 3 | Status: SHIPPED | OUTPATIENT
Start: 2025-01-09 | End: 2026-01-09

## 2025-01-09 RX ORDER — LEVOTHYROXINE SODIUM 112 UG/1
112 TABLET ORAL DAILY
Qty: 90 TABLET | Refills: 3 | Status: CANCELLED | OUTPATIENT
Start: 2025-01-09 | End: 2026-01-09

## 2025-01-09 RX ORDER — CELECOXIB 200 MG/1
200 CAPSULE ORAL DAILY
Qty: 90 CAPSULE | Refills: 3 | Status: SHIPPED | OUTPATIENT
Start: 2025-01-09 | End: 2026-01-09

## 2025-01-09 RX ORDER — LISINOPRIL AND HYDROCHLOROTHIAZIDE 12.5; 2 MG/1; MG/1
1 TABLET ORAL DAILY
Qty: 90 TABLET | Refills: 3 | Status: SHIPPED | OUTPATIENT
Start: 2025-01-09 | End: 2026-01-09

## 2025-01-09 SDOH — ECONOMIC STABILITY: FOOD INSECURITY: WITHIN THE PAST 12 MONTHS, YOU WORRIED THAT YOUR FOOD WOULD RUN OUT BEFORE YOU GOT MONEY TO BUY MORE.: NEVER TRUE

## 2025-01-09 SDOH — ECONOMIC STABILITY: FOOD INSECURITY: WITHIN THE PAST 12 MONTHS, THE FOOD YOU BOUGHT JUST DIDN'T LAST AND YOU DIDN'T HAVE MONEY TO GET MORE.: NEVER TRUE

## 2025-01-09 ASSESSMENT — ENCOUNTER SYMPTOMS
CONSTIPATION: 0
ABDOMINAL DISTENTION: 0
COUGH: 0
CHEST TIGHTNESS: 0
DIARRHEA: 0
SINUS PRESSURE: 0
WHEEZING: 0
SORE THROAT: 0
RHINORRHEA: 0
SHORTNESS OF BREATH: 0
NAUSEA: 0
BACK PAIN: 0
TROUBLE SWALLOWING: 0
BLOOD IN STOOL: 0
ABDOMINAL PAIN: 0

## 2025-01-09 ASSESSMENT — PATIENT HEALTH QUESTIONNAIRE - PHQ9
SUM OF ALL RESPONSES TO PHQ9 QUESTIONS 1 & 2: 0
SUM OF ALL RESPONSES TO PHQ QUESTIONS 1-9: 0
2. FEELING DOWN, DEPRESSED OR HOPELESS: NOT AT ALL
SUM OF ALL RESPONSES TO PHQ QUESTIONS 1-9: 0
1. LITTLE INTEREST OR PLEASURE IN DOING THINGS: NOT AT ALL
SUM OF ALL RESPONSES TO PHQ QUESTIONS 1-9: 0
SUM OF ALL RESPONSES TO PHQ QUESTIONS 1-9: 0

## 2025-01-09 NOTE — PROGRESS NOTES
[facility]  [facility] [address] [address]  Dept: [phone]    CHIEF COMPLAINT:      Chief Complaint   Patient presents with    Hypertension    Established New Doctor     Previous AD patient       ASSESSMENT & PLAN      1. Encounter for medical examination to establish care  2. Primary hypertension  -     lisinopril-hydroCHLOROthiazide (PRINZIDE;ZESTORETIC) 20-12.5 MG per tablet; Take 1 tablet by mouth daily, Disp-90 tablet, R-3Normal  -     verapamil (VERELAN) 240 MG extended release capsule; Take 1 capsule by mouth nightly, Disp-90 capsule, R-3Normal  3. Osteoporosis without current pathological fracture, unspecified osteoporosis type  -     celecoxib (CELEBREX) 200 MG capsule; Take 1 capsule by mouth daily, Disp-90 capsule, R-3Normal  4. H/O thyroidectomy  5. Malignant neoplasm of thyroid gland (HCC)  Assessment & Plan:   Monitored by specialist- no acute findings meriting change in the plan     Return in about 6 months (around 7/9/2025) for medicare well visit.       SUBJECTIVE/OBJECTIVE   Daniela William is a 84 y.o. female who presents to [facility] previously seen by Page Gonzalez NP. Past medical history is reviewed and noted below.     History of Thyroid cancer: she had thyroidectomy and then had a second occurrence so they removed the tissue with radioactive tablet, follows with Dr. Vang yearly.         Hypertension  This is a chronic problem. The current episode started more than 1 year ago. The problem is controlled. Pertinent negatives include no chest pain, headaches, palpitations or shortness of breath. Risk factors for coronary artery disease include obesity (history of cardiac murmur and left BBB, follows with Dr. Renteria cardiology). Past treatments include ACE inhibitors and diuretics. The current treatment provides mild improvement. There are no compliance problems.      Social: She is , 4 children, retired vocational rehab

## 2025-01-27 ENCOUNTER — TELEPHONE (OUTPATIENT)
Dept: FAMILY MEDICINE CLINIC | Age: 85
End: 2025-01-27

## 2025-01-27 DIAGNOSIS — I10 PRIMARY HYPERTENSION: Primary | ICD-10-CM

## 2025-01-27 RX ORDER — VERAPAMIL HYDROCHLORIDE 240 MG/1
240 TABLET, FILM COATED, EXTENDED RELEASE ORAL DAILY
Qty: 90 TABLET | Refills: 1 | Status: SHIPPED | OUTPATIENT
Start: 2025-01-27

## 2025-01-27 NOTE — TELEPHONE ENCOUNTER
LOV  01/09/2025  RTO    07/08/2025  LRF     01/09/2025    Patient stopped in the office stating that the Verapamil  Capsules  240 mg. Are  $90.00 higher in price then the tablets     Wanted to know if you could call in the tablets  Please           Controlled Substance Monitoring:    Acute and Chronic Pain Monitoring:        No data to display

## 2025-04-02 ENCOUNTER — TELEPHONE (OUTPATIENT)
Age: 85
End: 2025-04-02

## 2025-04-02 NOTE — TELEPHONE ENCOUNTER
Patient returned call to reschedule appt 4/25 . [facility] not able accommodate. Prefers appt to be on Tues or Thurs in mornings. Best time to reach her is morning around 9 am. Please call to discuss    Thank you

## 2025-05-20 ENCOUNTER — HOSPITAL ENCOUNTER (OUTPATIENT)
Dept: MAMMOGRAPHY | Age: 85
Discharge: HOME | End: 2025-05-22
Payer: MEDICARE

## 2025-05-20 VITALS — WEIGHT: 190 LBS | BODY MASS INDEX: 32.61 KG/M2

## 2025-05-20 DIAGNOSIS — Z12.31 OTHER SCREENING MAMMOGRAM: ICD-10-CM

## 2025-05-20 PROCEDURE — 77063 BREAST TOMOSYNTHESIS BI: CPT

## 2025-05-21 ENCOUNTER — RESULTS FOLLOW-UP (OUTPATIENT)
Dept: FAMILY MEDICINE CLINIC | Age: 85
End: 2025-05-21

## 2025-05-27 ENCOUNTER — OFFICE VISIT (OUTPATIENT)
Age: 85
End: 2025-05-27
Payer: MEDICARE

## 2025-05-27 VITALS — SYSTOLIC BLOOD PRESSURE: 134 MMHG | HEART RATE: 78 BPM | OXYGEN SATURATION: 97 % | DIASTOLIC BLOOD PRESSURE: 78 MMHG

## 2025-05-27 DIAGNOSIS — I44.7 LEFT BUNDLE-BRANCH BLOCK: Primary | ICD-10-CM

## 2025-05-27 DIAGNOSIS — R06.02 SHORTNESS OF BREATH: ICD-10-CM

## 2025-05-27 DIAGNOSIS — R01.1 HEART MURMUR: ICD-10-CM

## 2025-05-27 PROCEDURE — G8417 CALC BMI ABV UP PARAM F/U: HCPCS | Performed by: INTERNAL MEDICINE

## 2025-05-27 PROCEDURE — 1036F TOBACCO NON-USER: CPT | Performed by: INTERNAL MEDICINE

## 2025-05-27 PROCEDURE — G8399 PT W/DXA RESULTS DOCUMENT: HCPCS | Performed by: INTERNAL MEDICINE

## 2025-05-27 PROCEDURE — G8427 DOCREV CUR MEDS BY ELIG CLIN: HCPCS | Performed by: INTERNAL MEDICINE

## 2025-05-27 PROCEDURE — 3078F DIAST BP <80 MM HG: CPT | Performed by: INTERNAL MEDICINE

## 2025-05-27 PROCEDURE — 1159F MED LIST DOCD IN RCRD: CPT | Performed by: INTERNAL MEDICINE

## 2025-05-27 PROCEDURE — 99214 OFFICE O/P EST MOD 30 MIN: CPT | Performed by: INTERNAL MEDICINE

## 2025-05-27 PROCEDURE — 1123F ACP DISCUSS/DSCN MKR DOCD: CPT | Performed by: INTERNAL MEDICINE

## 2025-05-27 PROCEDURE — 1090F PRES/ABSN URINE INCON ASSESS: CPT | Performed by: INTERNAL MEDICINE

## 2025-05-27 PROCEDURE — 93000 ELECTROCARDIOGRAM COMPLETE: CPT | Performed by: INTERNAL MEDICINE

## 2025-05-27 PROCEDURE — 3075F SYST BP GE 130 - 139MM HG: CPT | Performed by: INTERNAL MEDICINE

## 2025-05-27 NOTE — PROGRESS NOTES
Cardiology Office Follow up            CC: Patient is here for murmur and abnormal echocardiogram.     HPI  Daniela William is here for 6-month follow-up.  Since last evaluation she does report dyspnea on exertion which is somewhat newer and slightly progressive.  No chest pain or angina.  No lower extremity edema.  Blood pressure and heart rate within normal limits.  No dizziness or lightheadedness.      Past Medical:  Past Medical History:   Diagnosis Date    Chronic knee pain     Cortisone injections    History of thyroid cancer     Hypertension     Polyneuropathy     Vitamin D deficiency        Past Surgical:  Past Surgical History:   Procedure Laterality Date    CARPAL TUNNEL RELEASE Left     HYSTERECTOMY (CERVIX STATUS UNKNOWN)      Still has ovaries    ORIF ANKLE FRACTURE Left     THYROIDECTOMY         Family History:  No family history on file.    Social History:  Social History     Tobacco Use    Smoking status: Never     Passive exposure: Past    Smokeless tobacco: Never   Vaping Use    Vaping status: Never Used   Substance Use Topics    Alcohol use: Never    Drug use: Never        REVIEW OF SYSTEMS:    Constitutional: there has been no unanticipated weight loss. There's been No change in energy level, No change in activity level.     Eyes: No visual changes or diplopia. No scleral icterus.  ENT: No Headaches, hearing loss or vertigo. No mouth sores or sore throat.  Cardiovascular: As described in HPI.   Respiratory: AS HPI  Gastrointestinal: No abdominal pain, appetite loss, blood in stools. No change in bowel or bladder habits.  Genitourinary: No dysuria, trouble voiding, or hematuria.  Musculoskeletal:  No gait disturbance, No weakness or joint complaints.  Integumentary: No rash or pruritis.  Neurological: No headache, diplopia, change in muscle strength, numbness or tingling  Psychiatric: No new anxiety or depression.  Endocrine: No temperature intolerance. No excessive thirst, fluid

## 2025-06-16 ENCOUNTER — TELEPHONE (OUTPATIENT)
Age: 85
End: 2025-06-16

## 2025-06-17 ENCOUNTER — HOSPITAL ENCOUNTER (OUTPATIENT)
Dept: NUCLEAR MEDICINE | Age: 85
Discharge: HOME | End: 2025-06-19
Attending: INTERNAL MEDICINE
Payer: MEDICARE

## 2025-06-17 ENCOUNTER — HOSPITAL ENCOUNTER (OUTPATIENT)
Age: 85
Discharge: HOME | End: 2025-06-19
Attending: INTERNAL MEDICINE
Payer: MEDICARE

## 2025-06-17 VITALS — SYSTOLIC BLOOD PRESSURE: 145 MMHG | DIASTOLIC BLOOD PRESSURE: 73 MMHG | HEART RATE: 88 BPM

## 2025-06-17 DIAGNOSIS — R06.02 SHORTNESS OF BREATH: ICD-10-CM

## 2025-06-17 PROCEDURE — 2500000003 HC RX 250 WO HCPCS: Performed by: INTERNAL MEDICINE

## 2025-06-17 PROCEDURE — 93017 CV STRESS TEST TRACING ONLY: CPT

## 2025-06-17 PROCEDURE — 78452 HT MUSCLE IMAGE SPECT MULT: CPT

## 2025-06-17 PROCEDURE — 6360000002 HC RX W HCPCS: Performed by: INTERNAL MEDICINE

## 2025-06-17 PROCEDURE — A9500 TC99M SESTAMIBI: HCPCS | Performed by: INTERNAL MEDICINE

## 2025-06-17 PROCEDURE — 3430000000 HC RX DIAGNOSTIC RADIOPHARMACEUTICAL: Performed by: INTERNAL MEDICINE

## 2025-06-17 PROCEDURE — 93018 CV STRESS TEST I&R ONLY: CPT | Performed by: RADIOLOGY

## 2025-06-17 PROCEDURE — 93016 CV STRESS TEST SUPVJ ONLY: CPT | Performed by: INTERNAL MEDICINE

## 2025-06-17 RX ORDER — AMINOPHYLLINE 25 MG/ML
50 INJECTION, SOLUTION INTRAVENOUS PRN
Status: DISCONTINUED | OUTPATIENT
Start: 2025-06-17 | End: 2025-06-17

## 2025-06-17 RX ORDER — NITROGLYCERIN 0.4 MG/1
0.4 TABLET SUBLINGUAL EVERY 5 MIN PRN
Status: DISCONTINUED | OUTPATIENT
Start: 2025-06-17 | End: 2025-06-17

## 2025-06-17 RX ORDER — METOPROLOL TARTRATE 1 MG/ML
5 INJECTION, SOLUTION INTRAVENOUS EVERY 5 MIN PRN
Status: DISCONTINUED | OUTPATIENT
Start: 2025-06-17 | End: 2025-06-17

## 2025-06-17 RX ORDER — REGADENOSON 0.08 MG/ML
0.4 INJECTION, SOLUTION INTRAVENOUS
Status: COMPLETED | OUTPATIENT
Start: 2025-06-17 | End: 2025-06-17

## 2025-06-17 RX ORDER — SODIUM CHLORIDE 9 MG/ML
500 INJECTION, SOLUTION INTRAVENOUS CONTINUOUS PRN
Status: DISCONTINUED | OUTPATIENT
Start: 2025-06-17 | End: 2025-06-17

## 2025-06-17 RX ORDER — SODIUM CHLORIDE 0.9 % (FLUSH) 0.9 %
10 SYRINGE (ML) INJECTION
Status: COMPLETED | OUTPATIENT
Start: 2025-06-17 | End: 2025-06-17

## 2025-06-17 RX ORDER — ATROPINE SULFATE 0.1 MG/ML
0.5 INJECTION INTRAVENOUS EVERY 5 MIN PRN
Status: DISCONTINUED | OUTPATIENT
Start: 2025-06-17 | End: 2025-06-17

## 2025-06-17 RX ORDER — TETRAKIS(2-METHOXYISOBUTYLISOCYANIDE)COPPER(I) TETRAFLUOROBORATE 1 MG/ML
10 INJECTION, POWDER, LYOPHILIZED, FOR SOLUTION INTRAVENOUS
Status: COMPLETED | OUTPATIENT
Start: 2025-06-17 | End: 2025-06-17

## 2025-06-17 RX ORDER — ALBUTEROL SULFATE 90 UG/1
2 INHALANT RESPIRATORY (INHALATION) PRN
Status: DISCONTINUED | OUTPATIENT
Start: 2025-06-17 | End: 2025-06-17

## 2025-06-17 RX ORDER — TETRAKIS(2-METHOXYISOBUTYLISOCYANIDE)COPPER(I) TETRAFLUOROBORATE 1 MG/ML
30 INJECTION, POWDER, LYOPHILIZED, FOR SOLUTION INTRAVENOUS
Status: COMPLETED | OUTPATIENT
Start: 2025-06-17 | End: 2025-06-17

## 2025-06-17 RX ORDER — SODIUM CHLORIDE 0.9 % (FLUSH) 0.9 %
5-40 SYRINGE (ML) INJECTION PRN
Status: DISCONTINUED | OUTPATIENT
Start: 2025-06-17 | End: 2025-06-17

## 2025-06-17 RX ORDER — SODIUM CHLORIDE 0.9 % (FLUSH) 0.9 %
10 SYRINGE (ML) INJECTION
Status: DISCONTINUED | OUTPATIENT
Start: 2025-06-17 | End: 2025-06-20 | Stop reason: HOSPADM

## 2025-06-17 RX ADMIN — SODIUM CHLORIDE, PRESERVATIVE FREE 10 ML: 5 INJECTION INTRAVENOUS at 09:06

## 2025-06-17 RX ADMIN — TETRAKIS(2-METHOXYISOBUTYLISOCYANIDE)COPPER(I) TETRAFLUOROBORATE 14.9 MILLICURIE: 1 INJECTION, POWDER, LYOPHILIZED, FOR SOLUTION INTRAVENOUS at 07:50

## 2025-06-17 RX ADMIN — REGADENOSON 0.4 MG: 0.08 INJECTION, SOLUTION INTRAVENOUS at 09:13

## 2025-06-17 RX ADMIN — SODIUM CHLORIDE, PRESERVATIVE FREE 10 ML: 5 INJECTION INTRAVENOUS at 07:50

## 2025-06-17 RX ADMIN — TETRAKIS(2-METHOXYISOBUTYLISOCYANIDE)COPPER(I) TETRAFLUOROBORATE 36.4 MILLICURIE: 1 INJECTION, POWDER, LYOPHILIZED, FOR SOLUTION INTRAVENOUS at 09:21

## 2025-06-17 NOTE — PROGRESS NOTES
Patient present for Lexiscan stress test. Educated on procedure. All questions/concerns addressed. Allergies and medication reviewed. Patient prepped for procedure. Stress test will be supervised by Dr. AUSTEN Renteria.

## 2025-06-18 ENCOUNTER — RESULTS FOLLOW-UP (OUTPATIENT)
Age: 85
End: 2025-06-18

## 2025-06-18 LAB
STRESS BASELINE DIAS BP: 75 MMHG
STRESS BASELINE HR: 73 BPM
STRESS BASELINE SYS BP: 159 MMHG
STRESS ESTIMATED WORKLOAD: 1 METS
STRESS PEAK DIAS BP: 75 MMHG
STRESS PEAK SYS BP: 159 MMHG
STRESS PERCENT HR ACHIEVED: 70 %
STRESS POST PEAK HR: 95 BPM
STRESS RATE PRESSURE PRODUCT: NORMAL BPM*MMHG
STRESS TARGET HR: 136 BPM

## 2025-06-18 NOTE — TELEPHONE ENCOUNTER
----- Message from Dr. Paulion Renteria MD sent at 6/18/2025  3:34 PM EDT -----  No significant abnormality noted on stress test  ----- Message -----  From: Rangel Simms MD  Sent: 6/18/2025   7:58 AM EDT  To: Paulino Renteria MD

## 2025-07-08 ENCOUNTER — OFFICE VISIT (OUTPATIENT)
Dept: FAMILY MEDICINE CLINIC | Age: 85
End: 2025-07-08
Payer: MEDICARE

## 2025-07-08 VITALS
TEMPERATURE: 97.6 F | HEIGHT: 64 IN | OXYGEN SATURATION: 97 % | HEART RATE: 71 BPM | BODY MASS INDEX: 33.63 KG/M2 | SYSTOLIC BLOOD PRESSURE: 116 MMHG | WEIGHT: 197 LBS | DIASTOLIC BLOOD PRESSURE: 70 MMHG

## 2025-07-08 DIAGNOSIS — Z90.89 H/O THYROIDECTOMY: ICD-10-CM

## 2025-07-08 DIAGNOSIS — Z98.890 H/O THYROIDECTOMY: ICD-10-CM

## 2025-07-08 DIAGNOSIS — R01.1 MURMUR, HEART: ICD-10-CM

## 2025-07-08 DIAGNOSIS — Z00.00 MEDICARE ANNUAL WELLNESS VISIT, SUBSEQUENT: Primary | ICD-10-CM

## 2025-07-08 DIAGNOSIS — I10 PRIMARY HYPERTENSION: ICD-10-CM

## 2025-07-08 PROBLEM — M17.11 ARTHRITIS OF RIGHT KNEE: Status: ACTIVE | Noted: 2025-07-08

## 2025-07-08 PROBLEM — E78.2 MIXED HYPERLIPIDEMIA: Status: ACTIVE | Noted: 2025-07-08

## 2025-07-08 PROBLEM — E89.0 POST-SURGICAL HYPOTHYROIDISM: Status: ACTIVE | Noted: 2025-07-08

## 2025-07-08 PROBLEM — D36.9 BENIGN NEOPLASM, UNSPECIFIED SITE: Status: ACTIVE | Noted: 2025-07-08

## 2025-07-08 PROBLEM — G62.9 NEUROPATHY: Status: ACTIVE | Noted: 2025-07-08

## 2025-07-08 PROBLEM — G43.909 MIGRAINE, UNSPECIFIED, NOT INTRACTABLE, WITHOUT STATUS MIGRAINOSUS: Status: ACTIVE | Noted: 2025-07-08

## 2025-07-08 PROBLEM — M50.10 CERVICAL DISC DISORDER WITH RADICULOPATHY, UNSPECIFIED CERVICAL REGION: Status: ACTIVE | Noted: 2025-07-08

## 2025-07-08 PROBLEM — G47.33 OBSTRUCTIVE SLEEP APNEA SYNDROME: Status: ACTIVE | Noted: 2025-07-08

## 2025-07-08 PROBLEM — H93.19 TINNITUS: Status: ACTIVE | Noted: 2025-07-08

## 2025-07-08 PROBLEM — E03.3 POSTINFECTIOUS HYPOTHYROIDISM: Status: ACTIVE | Noted: 2025-07-08

## 2025-07-08 PROBLEM — G56.03 BILATERAL CARPAL TUNNEL SYNDROME: Status: ACTIVE | Noted: 2025-07-08

## 2025-07-08 PROBLEM — G47.01 INSOMNIA DUE TO MEDICAL CONDITION: Status: RESOLVED | Noted: 2025-07-08 | Resolved: 2025-07-08

## 2025-07-08 PROBLEM — M81.0 AGE-RELATED OSTEOPOROSIS WITHOUT CURRENT PATHOLOGICAL FRACTURE: Status: ACTIVE | Noted: 2025-07-08

## 2025-07-08 PROBLEM — E55.9 VITAMIN D DEFICIENCY: Status: ACTIVE | Noted: 2025-07-08

## 2025-07-08 PROBLEM — E87.6 HYPOKALEMIA: Status: RESOLVED | Noted: 2025-07-08 | Resolved: 2025-07-08

## 2025-07-08 PROBLEM — R51.9 HEADACHE: Status: ACTIVE | Noted: 2025-07-08

## 2025-07-08 PROBLEM — Z13.29 ENCOUNTER FOR SCREENING FOR OTHER SUSPECTED ENDOCRINE DISORDER: Status: ACTIVE | Noted: 2025-07-08

## 2025-07-08 PROBLEM — R07.89 OTHER CHEST PAIN: Status: ACTIVE | Noted: 2025-07-08

## 2025-07-08 PROBLEM — M85.80 OTHER SPECIFIED DISORDERS OF BONE DENSITY AND STRUCTURE, UNSPECIFIED SITE: Status: ACTIVE | Noted: 2025-07-08

## 2025-07-08 PROBLEM — N39.41 URGE INCONTINENCE: Status: ACTIVE | Noted: 2025-07-08

## 2025-07-08 PROBLEM — N95.9 UNSPECIFIED MENOPAUSAL AND PERIMENOPAUSAL DISORDER: Status: ACTIVE | Noted: 2025-07-08

## 2025-07-08 PROBLEM — M19.019 ARTHRITIS OF SHOULDER: Status: ACTIVE | Noted: 2025-07-08

## 2025-07-08 PROBLEM — K59.01 SLOW TRANSIT CONSTIPATION: Status: ACTIVE | Noted: 2025-07-08

## 2025-07-08 PROBLEM — K21.9 GASTRO-ESOPHAGEAL REFLUX DISEASE WITHOUT ESOPHAGITIS: Status: ACTIVE | Noted: 2025-07-08

## 2025-07-08 PROBLEM — E04.2 NON-TOXIC MULTINODULAR GOITER: Status: ACTIVE | Noted: 2025-07-08

## 2025-07-08 PROBLEM — R42 DIZZINESS AND GIDDINESS: Status: ACTIVE | Noted: 2025-07-08

## 2025-07-08 PROBLEM — M54.12 RADICULOPATHY, CERVICAL REGION: Status: ACTIVE | Noted: 2025-07-08

## 2025-07-08 PROBLEM — H83.2X9 LABYRINTHINE DYSFUNCTION, UNSPECIFIED EAR: Status: ACTIVE | Noted: 2025-07-08

## 2025-07-08 PROBLEM — G89.29 OTHER CHRONIC PAIN: Status: ACTIVE | Noted: 2025-07-08

## 2025-07-08 PROBLEM — F43.21 GRIEF: Status: RESOLVED | Noted: 2025-07-08 | Resolved: 2025-07-08

## 2025-07-08 PROBLEM — G62.9 POLYNEUROPATHY, UNSPECIFIED: Status: ACTIVE | Noted: 2025-07-08

## 2025-07-08 PROBLEM — D64.9 ANEMIA, UNSPECIFIED: Status: ACTIVE | Noted: 2025-07-08

## 2025-07-08 PROBLEM — M72.0 DUPUYTREN CONTRACTURE: Status: ACTIVE | Noted: 2025-07-08

## 2025-07-08 PROBLEM — H61.22 IMPACTED CERUMEN, LEFT EAR: Status: RESOLVED | Noted: 2025-07-08 | Resolved: 2025-07-08

## 2025-07-08 PROCEDURE — G0439 PPPS, SUBSEQ VISIT: HCPCS | Performed by: REGISTERED NURSE

## 2025-07-08 PROCEDURE — 3074F SYST BP LT 130 MM HG: CPT | Performed by: REGISTERED NURSE

## 2025-07-08 PROCEDURE — 1159F MED LIST DOCD IN RCRD: CPT | Performed by: REGISTERED NURSE

## 2025-07-08 PROCEDURE — 3078F DIAST BP <80 MM HG: CPT | Performed by: REGISTERED NURSE

## 2025-07-08 PROCEDURE — 1160F RVW MEDS BY RX/DR IN RCRD: CPT | Performed by: REGISTERED NURSE

## 2025-07-08 PROCEDURE — 1123F ACP DISCUSS/DSCN MKR DOCD: CPT | Performed by: REGISTERED NURSE

## 2025-07-08 RX ORDER — LEVOTHYROXINE SODIUM 112 UG/1
112 TABLET ORAL DAILY
Qty: 90 TABLET | Refills: 3 | Status: CANCELLED | OUTPATIENT
Start: 2025-07-08 | End: 2026-07-08

## 2025-07-08 ASSESSMENT — ENCOUNTER SYMPTOMS
WHEEZING: 0
DIARRHEA: 0
BLOOD IN STOOL: 0
SORE THROAT: 0
ABDOMINAL DISTENTION: 0
TROUBLE SWALLOWING: 0
ABDOMINAL PAIN: 0
COUGH: 0
SINUS PRESSURE: 0
SHORTNESS OF BREATH: 0
NAUSEA: 0
RHINORRHEA: 0
CONSTIPATION: 0
BACK PAIN: 0
CHEST TIGHTNESS: 0

## 2025-07-08 ASSESSMENT — PATIENT HEALTH QUESTIONNAIRE - PHQ9
SUM OF ALL RESPONSES TO PHQ QUESTIONS 1-9: 0
SUM OF ALL RESPONSES TO PHQ QUESTIONS 1-9: 0
2. FEELING DOWN, DEPRESSED OR HOPELESS: NOT AT ALL
1. LITTLE INTEREST OR PLEASURE IN DOING THINGS: NOT AT ALL
SUM OF ALL RESPONSES TO PHQ QUESTIONS 1-9: 0
SUM OF ALL RESPONSES TO PHQ QUESTIONS 1-9: 0

## 2025-07-08 NOTE — PROGRESS NOTES
canal and external ear normal.      Left Ear: Tympanic membrane, ear canal and external ear normal.      Nose: Congestion present.   Eyes:      Pupils: Pupils are equal, round, and reactive to light.   Cardiovascular:      Rate and Rhythm: Normal rate and regular rhythm.      Heart sounds: Murmur heard.   Pulmonary:      Effort: Pulmonary effort is normal.      Breath sounds: Normal breath sounds.   Abdominal:      Palpations: Abdomen is soft.   Musculoskeletal:         General: Normal range of motion.   Skin:     General: Skin is warm and dry.      Capillary Refill: Capillary refill takes less than 2 seconds.   Neurological:      General: No focal deficit present.      Mental Status: She is alert and oriented to person, place, and time.   Psychiatric:         Mood and Affect: Mood normal.         Behavior: Behavior normal.                Allergies   Allergen Reactions    Sulfa Antibiotics Hives     Other reaction(s): blood clots between teeth     Prior to Visit Medications    Medication Sig Taking? Authorizing Provider   verapamil (CALAN SR) 240 MG extended release tablet Take 1 tablet by mouth daily Yes Luigi Barrera APRN - CNP   celecoxib (CELEBREX) 200 MG capsule Take 1 capsule by mouth daily Yes Luigi Barrera APRN - CNP   lisinopril-hydroCHLOROthiazide (PRINZIDE;ZESTORETIC) 20-12.5 MG per tablet Take 1 tablet by mouth daily Yes Luigi Barrera APRN - CNP   levothyroxine (SYNTHROID) 112 MCG tablet Take 1 tablet by mouth Daily Yes Page Gonzalez APRN - CNP   Calcium Citrate-Vitamin D3 315-6.25 MG-MCG TABS Take 600 mg by mouth in the morning, at noon, and at bedtime Yes Juvenal Belcher MD   Multiple Vitamins-Minerals (MULTIVITAL-M PO) Multivitamin TABS   Refills: 0    Active Yes Juvenal Belcher MD   VITAMIN B COMPLEX-C PO  Yes Provider, Historical, MD       CareTeam (Including outside providers/suppliers regularly involved in providing care):   Patient Care Team:  Luigi Barrera APRN - CNP as PCP - General

## 2025-07-08 NOTE — PATIENT INSTRUCTIONS
participating in exercise and having meals with you, even if they may be eating something different.  Find what works best for you. If you do not have time or do not like to cook, a program that offers meal replacement bars or shakes may be better for you. Or if you like to prepare meals, finding a plan that includes daily menus and recipes may be best.  Ask your doctor about other health professionals who can help you achieve your weight-loss goals.  A dietitian can help you make healthy changes in your diet.  An exercise specialist or  can help you develop a safe and effective exercise program.  A counselor or psychiatrist can help you cope with issues such as depression, anxiety, or family problems that can make it hard to focus on weight loss.  Consider joining a support group for people who are trying to lose weight. Your doctor can suggest groups in your area.  Where can you learn more?  Go to https://www.Invenias.Fliqz/patientEd and enter U357 to learn more about \"Starting a Weight-Loss Plan: Care Instructions.\"  Current as of: April 30, 2024  Content Version: 14.5  © 5124-0713 [employer].   Care instructions adapted under license by [employer]. If you have questions about a medical condition or this instruction, always ask your healthcare professional. [employer] disclaims any warranty or liability for your use of this information.         A Healthy Heart: Care Instructions  Overview     Coronary artery disease, also called heart disease, occurs when a substance called plaque builds up in the vessels that supply oxygen-rich blood to your heart muscle. This can narrow the blood vessels and reduce blood flow. A heart attack happens when blood flow is completely blocked. A high-fat diet, smoking, and other factors increase the risk of heart disease.  Your doctor has found that you have a chance of having heart disease. A heart-healthy lifestyle can help keep your heart

## 2025-07-31 DIAGNOSIS — I10 PRIMARY HYPERTENSION: ICD-10-CM

## 2025-07-31 RX ORDER — VERAPAMIL HYDROCHLORIDE 240 MG/1
240 TABLET, FILM COATED, EXTENDED RELEASE ORAL DAILY
Qty: 90 TABLET | Refills: 1 | Status: SHIPPED | OUTPATIENT
Start: 2025-07-31

## 2025-07-31 NOTE — TELEPHONE ENCOUNTER
LOV: 7/8/2025    RTO:   Future Appointments   Date Time Provider Department Center   12/2/2025 12:00 PM Paulino Renteria MD [facility]   1/12/2026 10:40 AM Luigi Barrera APRN - CNP [place]  [facility]     LRF: 1/27/2025          Controlled Substance Monitoring:    Acute and Chronic Pain Monitoring:        No data to display